# Patient Record
Sex: FEMALE | Race: BLACK OR AFRICAN AMERICAN | NOT HISPANIC OR LATINO | Employment: OTHER | URBAN - METROPOLITAN AREA
[De-identification: names, ages, dates, MRNs, and addresses within clinical notes are randomized per-mention and may not be internally consistent; named-entity substitution may affect disease eponyms.]

---

## 2018-09-10 ENCOUNTER — HOSPITAL ENCOUNTER (OUTPATIENT)
Dept: ORTHOPEDIC SURGERY | Facility: CLINIC | Age: 63
Discharge: HOME | End: 2018-09-10
Attending: PODIATRIST | Admitting: PODIATRIST

## 2019-06-11 ENCOUNTER — CONVERSION ENCOUNTER (OUTPATIENT)
Dept: ENDOCRINOLOGY | Facility: CLINIC | Age: 64
End: 2019-06-11

## 2019-06-16 VITALS
WEIGHT: 286 LBS | BODY MASS INDEX: 56.15 KG/M2 | OXYGEN SATURATION: 97 % | HEIGHT: 60 IN | SYSTOLIC BLOOD PRESSURE: 118 MMHG | DIASTOLIC BLOOD PRESSURE: 58 MMHG | HEART RATE: 93 BPM

## 2019-06-17 NOTE — PROGRESS NOTES
Visit Type:  Follow-up Visit  Referring Provider:  Naveen Mcgee MD  Primary Provider:  Naveen Mcgee MD    CC:  fu dm 2.    History of Present Illness:  Here  for diabetes f/u.  Blood sugars in the high 100's.  Had to cancel education class.  Due for eye exam.      Standards of Care   Discussed Driving Precautions: yes  Discussed Carrying Glucose Source: Advised  Discussed Wear DM Alert ID: Advised  no  Last Eye Exam: 5/2018  Influenza vaccine: 2018  Pneumovax: 2018      Past Medical History:     Reviewed history from 11/27/2018 and no changes required:        Diabetes type 2  1990's        Peripheral Neuropathy        Hypertension        Cataracts        vit D Deficiency 11/2018        Hypothyroidism  11/2018    Past Surgical History:     Reviewed history from 07/30/2018 and no changes required:        Javier  2014        R inguinal Hernia repair  2012    Family History Summary:      Reviewed history Last on 05/15/2019 and no changes required:06/16/2019      General Comments - FH:  FH Diabetes Mother, Two brothers  FH High blood pressure - [place]     Social History:     Reviewed history from 05/15/2019 and no changes required:        Patient has never smoked.        Passive Smoke: N        Alcohol Use: N        Drug Use: N        HIV/High Risk: N        Regular Exercise: N        Hx Domestic Abuse: N        Jewish Affecting Care: N                Risk Factors:     Smoked Tobacco Use:  Never smoker  Caffeine use:  2 drinks per day  Alcohol use:  no  Exercise:  no  Seatbelt use:  100 %  Sun Exposure:  occasionally    Previous Tobacco Use: Signed On - 05/15/2019  Smoked Tobacco Use:  Never smoker  Caffeine use:  2 drinks per day    Previous Alcohol Use: Signed On - 05/15/2019  Alcohol use:  no  Exercise:  no  Seatbelt use:  100 %  Sun Exposure:  occasionally    Active Medications (reviewed today):  PRAVASTATIN SODIUM 10 MG ORAL TABLET (PRAVASTATIN SODIUM) take 1 tablet daily  ADMELOG SOLOSTAR 100 UNIT/ML  SUBCUTANEOUS SOLUTION PEN-INJECTOR (INSULIN LISPRO) 15  units sq ac meals  ONETOUCH DELICA LANCETS FINE (LANCETS) use to test twice a day  LEVOTHYROXINE SODIUM 50 MCG ORAL TABLET (LEVOTHYROXINE SODIUM) one daily, 1st thing in am, by itself  VITAMIN D2 1.25MG(50,000 UNIT) (ERGOCALCIFEROL) TAKE ONE WEEKLY  METFORMIN HCL  MG ORAL TABLET EXTENDED RELEASE 24 HOUR (METFORMIN HCL) one tablet before breakfast & supper  ONETOUCH ULTRA 2 W/DEVICE KIT (BLOOD GLUCOSE MONITORING SUPPL) use to test twice a day  FOLIC ACID 1 MG ORAL TABLET (FOLIC ACID) Take 1 tablet by mouth daily  PLAVIX 75 MG ORAL TABLET (CLOPIDOGREL BISULFATE) Take 1 tablet by mouth daily  DILTIAZEM HCL ER BEADS 240 MG ORAL CAPSULE EXTENDED RELEASE 24 HOUR (DILTIAZEM HCL ER BEADS) Take 1 tablet by mouth daily  YL VITAMIN B-6 100 MG ORAL TABLET (PYRIDOXINE HCL) Take 1 tablet by mouth daily  DICYCLOMINE HCL 10 MG ORAL CAPSULE (DICYCLOMINE HCL) 1 capsule by mouth before meals  B-12 1000 MCG ORAL TABLET (CYANOCOBALAMIN) Take 1 tablet by mouth daily  VENTOLIN  (90 BASE) MCG/ACT INHALATION AEROSOL SOLUTION (ALBUTEROL SULFATE) Inhale 2 puffs 4 times a day as needed  DULOXETINE HCL 60 MG ORAL CAPSULE DELAYED RELEASE PARTICLES (DULOXETINE HCL) Take 1 tablet by mouth daily  LYRICA 150 MG ORAL CAPSULE (PREGABALIN) Take one (1) tablet by mouth twice a day  BD PEN NEEDLE MINI U/F 31G X 5 MM (INSULIN PEN NEEDLE) Use as directed 5 x a day E11.65  ONETOUCH ULTRA BLUE IN VITRO STRIP (GLUCOSE BLOOD) use to test twice a day  FUROSEMIDE 40 MG ORAL TABLET (FUROSEMIDE) Take one (1) tablet by mouth twice a day  NABUMETONE 500 MG ORAL TABLET (NABUMETONE) Take one (1) tablet by mouth three times a day  [facility] OMEPRAZOLE 20 MG ORAL TABLET DELAYED RELEASE (OMEPRAZOLE) Take 1 tablet by mouth daily  TRESIBA FLEXTOUCH 100 UNIT/ML SUBCUTANEOUS SOLUTION PEN-INJECTOR (INSULIN DEGLUDEC) 60 units twice a day    Current Allergies (reviewed today):  * PENICILLIN (Moderate)  * SULFA  (Moderate)      Review of Systems     General       lost 5 lb since last visit        Vital Signs:    Patient Profile:    63 Years Old Female  Height:     60 inches  Weight:     286 pounds  BMI:        55.85     O2 Sat:     97 %  Pulse rate: 93 / minute  BP Sittin / 58  (left arm)    Cuff size:  large      Problems: Active problems were reviewed with the patient during this visit.  Medications: Medications were reviewed with the patient during this visit.  Allergies: Allergies were reviewed with the patient during this visit.        Vitals Entered By: Lizeth Chapa MA (2019 12:09 PM)      Physical Exam    General:      obese.    Mouth:      dentures  Lungs:      clear  Heart:      regular rhythm    Abdomen:      obese, soft, normal peristalsis    Pulses:      Pulses normal in all 4 extremities.  Extremities:      trace ankle edema, plantar calluses  Neurologic:      same as 1 y ago:  absent reflexes in ankles, vibratory sense 50% decreased in toes, able to feel the 10g monofilament in heels only.    Diabetes Management Exam:      Foot Exam (with socks and/or shoes not present):        Pulses:           Pulses normal in all 4 extremities.      Blood Pressure:  Today's BP: 118/58 mm Hg          Impression & Recommendations:    Problem # 1:  Diabetes mellitus, type II, uncontrolled (ICD-250.02) (YXH86-H42.65)  Assessment: Improved  A1C 8.7% ( with hyperglycemia ), cr 1.61, BUN 29, K 3.7, ALT 24  Continue same insulin doses.  Drink plenty of water.  Increase exercise as able.  See eye doctor tomorrow as scheduled.  Her updated medication list for this problem includes:     Admelog Solostar 100 Unit/ml Subcutaneous Solution Pen-injector (Insulin lispro) ..... 15  units sq ac meals     Metformin Hcl Er 500 Mg Oral Tablet Extended Release 24 Hour (Metformin hcl) ..... One tablet before breakfast & supper     Tresiba Flextouch 100 Unit/ml Subcutaneous Solution Pen-injector (Insulin degludec) ..... 60 units  twice a day    Orders:  Glucose (06915)  [facility] HEMOGLOBIN A1c (A1DCA)  85873-Doc Vst-Est Level III (CPT-63053)      Problem # 2:  Hypertension (ICD-401.9) (FSA75-Z00)  Assessment: Unchanged  /58, P 93  Continue same meds.  Her updated medication list for this problem includes:     Diltiazem Hcl Er Beads 240 Mg Oral Capsule Extended Release 24 Hour (Diltiazem hcl er beads) ..... Take 1 tablet by mouth daily     Furosemide 40 Mg Oral Tablet (Furosemide) ..... Take one (1) tablet by mouth twice a day    Orders:  [facility] COMPREHENSIVE METABOLIC PANEL (CMP) [place]  39380-Bvy Vst-Est Level III (CPT-11247)      Medications Added to Medication List This Visit:  1)  Symbicort 160-4.5 Mcg/act Inhalation Aerosol (Budesonide-formoterol fumarate) .... Use twice daily      Patient Instructions:  1)  Keep up the good work!  2)  Reschedule diabetes education all day class.  3)  Increase exercise as able.  4)  Drink plenty of water.  5)  See eye doctor tomorrow as scheduled.  6)  Please schedule a follow-up appointment in 6 months.  7)  Be sure to return for lab work one (1) week before your next appointment as scheduled.                    Medication Administration    Orders Added:  1)  Glucose [36544]  2)  [facility] HEMOGLOBIN A1c [A1DCA]  3)  [facility] COMPREHENSIVE METABOLIC PANEL (CMP) [place]  4)  39346-Txn Vst-Est Level III [CPT-96081]  ]  Technician: Hellen Chapa MA         Date/Time Collected: June 11, 2019 12:09 PM)  Date/Time Received: June 11, 2019 12:09 PM)  Performed by: hellen chapa    Glucose (rdm):  176 mg/dL        mg/dL (normal range)  Comments:    ate@ 9am this morning  pp 3 hours ago  insulin@ 845am this morning              Electronically signed by Yo Marshall MD on 06/16/2019 at 8:36 PM  ________________________________________________________________________       Disclaimer: Converted Note message may not contain all data elements that existed in the [employer] source system. Please see [facility] for the original note details.

## 2019-06-27 RX ORDER — PRAVASTATIN SODIUM 10 MG
TABLET ORAL
Qty: 30 TABLET | Refills: 3 | OUTPATIENT
Start: 2019-06-27

## 2019-06-27 RX ORDER — INSULIN LISPRO 100 U/ML
INJECTION, SOLUTION SUBCUTANEOUS
Refills: 3 | OUTPATIENT
Start: 2019-06-27

## 2019-06-28 RX ORDER — PRAVASTATIN SODIUM 10 MG
10 TABLET ORAL EVERY EVENING
Qty: 30 TABLET | Refills: 11 | Status: SHIPPED | OUTPATIENT
Start: 2019-06-28 | End: 2020-06-27

## 2019-07-23 RX ORDER — INSULIN LISPRO 100 U/ML
INJECTION, SOLUTION SUBCUTANEOUS
Qty: 15 ML | Refills: 3 | Status: SHIPPED | OUTPATIENT
Start: 2019-07-23 | End: 2019-08-01 | Stop reason: SDUPTHER

## 2019-08-01 RX ORDER — INSULIN LISPRO 100 U/ML
INJECTION, SOLUTION SUBCUTANEOUS
Qty: 15 ML | Refills: 3 | Status: SHIPPED | OUTPATIENT
Start: 2019-08-01 | End: 2020-04-23 | Stop reason: CLARIF

## 2019-08-21 ENCOUNTER — OFFICE VISIT (OUTPATIENT)
Dept: PODIATRY | Facility: CLINIC | Age: 64
End: 2019-08-21

## 2019-08-21 VITALS
BODY MASS INDEX: 55.95 KG/M2 | DIASTOLIC BLOOD PRESSURE: 87 MMHG | SYSTOLIC BLOOD PRESSURE: 139 MMHG | WEIGHT: 285 LBS | HEART RATE: 90 BPM | HEIGHT: 60 IN

## 2019-08-21 DIAGNOSIS — M20.11 HALLUX VALGUS, RIGHT: ICD-10-CM

## 2019-08-21 DIAGNOSIS — E11.42 DM TYPE 2 WITH DIABETIC PERIPHERAL NEUROPATHY (HCC): ICD-10-CM

## 2019-08-21 DIAGNOSIS — M10.9 ACUTE GOUT OF RIGHT FOOT, UNSPECIFIED CAUSE: Primary | ICD-10-CM

## 2019-08-21 PROBLEM — I10 HYPERTENSION: Status: ACTIVE | Noted: 2018-07-30

## 2019-08-21 PROBLEM — E11.65 TYPE 2 DIABETES MELLITUS WITH HYPERGLYCEMIA (HCC): Status: ACTIVE | Noted: 2018-07-30

## 2019-08-21 PROBLEM — E55.9 VITAMIN D DEFICIENCY: Status: ACTIVE | Noted: 2018-07-30

## 2019-08-21 PROBLEM — E03.9 HYPOTHYROIDISM: Status: ACTIVE | Noted: 2018-11-27

## 2019-08-21 PROCEDURE — 99213 OFFICE O/P EST LOW 20 MIN: CPT | Performed by: PODIATRIST

## 2019-08-21 PROCEDURE — 97760 ORTHOTIC MGMT&TRAING 1ST ENC: CPT | Performed by: PODIATRIST

## 2019-08-21 RX ORDER — FUROSEMIDE 40 MG/1
40 TABLET ORAL 2 TIMES DAILY
Refills: 5 | COMMUNITY
Start: 2019-08-12

## 2019-08-21 RX ORDER — DILTIAZEM HYDROCHLORIDE 240 MG/1
240 CAPSULE, COATED, EXTENDED RELEASE ORAL EVERY MORNING
Refills: 3 | COMMUNITY
Start: 2019-05-28 | End: 2020-01-14

## 2019-08-21 RX ORDER — METFORMIN HYDROCHLORIDE 500 MG/1
TABLET, EXTENDED RELEASE ORAL
Refills: 12 | COMMUNITY
Start: 2019-07-13 | End: 2019-09-10 | Stop reason: SDUPTHER

## 2019-08-21 RX ORDER — FOLIC ACID 1 MG/1
1000 TABLET ORAL DAILY
Refills: 3 | COMMUNITY
Start: 2019-06-18

## 2019-08-21 RX ORDER — CLOPIDOGREL BISULFATE 75 MG/1
75 TABLET ORAL DAILY
Refills: 3 | COMMUNITY
Start: 2019-06-18

## 2019-08-21 RX ORDER — NICOTINE POLACRILEX 4 MG/1
GUM, CHEWING ORAL 2 TIMES DAILY
COMMUNITY
Start: 2018-07-30 | End: 2020-01-14 | Stop reason: SDUPTHER

## 2019-08-21 RX ORDER — INSULIN DEGLUDEC INJECTION 100 U/ML
INJECTION, SOLUTION SUBCUTANEOUS
Refills: 3 | COMMUNITY
Start: 2019-07-14 | End: 2020-01-10

## 2019-08-21 RX ORDER — KETOROLAC TROMETHAMINE 10 MG/1
10 TABLET, FILM COATED ORAL 3 TIMES DAILY
Refills: 0 | COMMUNITY
Start: 2019-08-11 | End: 2020-07-22

## 2019-08-21 RX ORDER — ERGOCALCIFEROL 1.25 MG/1
50000 CAPSULE ORAL
Refills: 3 | COMMUNITY
Start: 2019-08-07 | End: 2020-09-14

## 2019-08-21 RX ORDER — CYANOCOBALAMIN (VITAMIN B-12) 1000 MCG
TABLET ORAL EVERY 24 HOURS
COMMUNITY
Start: 2018-07-30 | End: 2020-01-14

## 2019-08-21 RX ORDER — DULOXETIN HYDROCHLORIDE 60 MG/1
60 CAPSULE, DELAYED RELEASE ORAL DAILY
Refills: 1 | COMMUNITY
Start: 2019-07-30

## 2019-08-21 RX ORDER — NABUMETONE 500 MG/1
500 TABLET, FILM COATED ORAL 3 TIMES DAILY
Refills: 1 | COMMUNITY
Start: 2019-05-28 | End: 2020-01-14

## 2019-08-21 RX ORDER — HYDROCODONE BITARTRATE AND ACETAMINOPHEN 5; 325 MG/1; MG/1
TABLET ORAL
Refills: 0 | COMMUNITY
Start: 2019-08-11 | End: 2020-01-14

## 2019-08-21 RX ORDER — LEVOTHYROXINE SODIUM 0.05 MG/1
TABLET ORAL
Refills: 4 | COMMUNITY
Start: 2019-05-24 | End: 2020-02-21

## 2019-08-21 NOTE — PATIENT INSTRUCTIONS
Gout    Gout is painful swelling that can occur in some of your joints. Gout is a type of arthritis. This condition is caused by having too much uric acid in your body. Uric acid is a chemical that forms when your body breaks down substances called purines. Purines are important for building body proteins.  When your body has too much uric acid, sharp crystals can form and build up inside your joints. This causes pain and swelling. Gout attacks can happen quickly and be very painful (acute gout). Over time, the attacks can affect more joints and become more frequent (chronic gout). Gout can also cause uric acid to build up under your skin and inside your kidneys.  What are the causes?  This condition is caused by too much uric acid in your blood. This can occur because:  · Your kidneys do not remove enough uric acid from your blood. This is the most common cause.  · Your body makes too much uric acid. This can occur with some cancers and cancer treatments. It can also occur if your body is breaking down too many red blood cells (hemolytic anemia).  · You eat too many foods that are high in purines. These foods include organ meats and some seafood. Alcohol, especially beer, is also high in purines.  A gout attack may be triggered by trauma or stress.  What increases the risk?  This condition is more likely to develop in people who:  · Have a family history of gout.  · Are male and middle-aged.  · Are female and have gone through menopause.  · Are obese.  · Frequently drink alcohol, especially beer.  · Are dehydrated.  · Lose weight too quickly.  · Have an organ transplant.  · Have lead poisoning.  · Take certain medicines, including aspirin, cyclosporine, diuretics, levodopa, and niacin.  · Have kidney disease or psoriasis.  What are the signs or symptoms?  An attack of acute gout happens quickly. It usually occurs in just one joint. The most common place is the big toe. Attacks often start at night. Other joints  that may be affected include joints of the feet, ankle, knee, fingers, wrist, or elbow. Symptoms may include:  · Severe pain.  · Warmth.  · Swelling.  · Stiffness.  · Tenderness. The affected joint may be very painful to touch.  · Shiny, red, or purple skin.  · Chills and fever.  Chronic gout may cause symptoms more frequently. More joints may be involved. You may also have white or yellow lumps (tophi) on your hands or feet or in other areas near your joints.  How is this diagnosed?  This condition is diagnosed based on your symptoms, medical history, and physical exam. You may have tests, such as:  · Blood tests to measure uric acid levels.  · Removal of joint fluid with a needle (aspiration) to look for uric acid crystals.  · X-rays to look for joint damage.  How is this treated?  Treatment for this condition has two phases: treating an acute attack and preventing future attacks. Acute gout treatment may include medicines to reduce pain and swelling, including:  · NSAIDs.  · Steroids. These are strong anti-inflammatory medicines that can be taken by mouth (orally) or injected into a joint.  · Colchicine. This medicine relieves pain and swelling when it is taken soon after an attack. It can be given orally or through an IV tube.  Preventive treatment may include:  · Daily use of smaller doses of NSAIDs or colchicine.  · Use of a medicine that reduces uric acid levels in your blood.  · Changes to your diet. You may need to see a specialist about healthy eating (dietitian).  Follow these instructions at home:  During a gout attack    · If directed, apply ice to the affected area:  ? Put ice in a plastic bag.  ? Place a towel between your skin and the bag.  ? Leave the ice on for 20 minutes, 2-3 times a day.  · Rest the joint as much as possible. If the affected joint is in your leg, you may be given crutches to use.  · Raise (elevate) the affected joint above the level of your heart as often as possible.  · Drink  enough fluids to keep your urine pale yellow.  · Take over-the-counter and prescription medicines only as told by your health care provider.  · Do not drive or operate heavy machinery while taking prescription pain medicine.  · Follow instructions from your health care provider about eating or drinking restrictions.  · Return to your normal activities as told by your health care provider. Ask your health care provider what activities are safe for you.  Avoiding future gout attacks    · Follow a low-purine diet as told by your dietitian or health care provider. Avoid foods and drinks that are high in purines, including liver, kidney, anchovies, asparagus, herring, mushrooms, mussels, and beer.  · Limit alcohol intake to no more than 1 drink a day for nonpregnant women and 2 drinks a day for men. One drink equals 12 oz of beer, 5 oz of wine, or 1½ oz of hard liquor.  · Maintain a healthy weight or lose weight if you are overweight. If you want to lose weight, talk with your health care provider. It is important that you do not lose weight too quickly.  · Start or maintain an exercise program as told by your health care provider.  · Drink enough fluids to keep your urine pale yellow.  · Take over-the-counter and prescription medicines only as told by your health care provider.  · Keep all follow-up visits as told by your health care provider. This is important.  Contact a health care provider if:  · You have another gout attack.  · You continue to have symptoms of a gout attack after10 days of treatment.  · You have side effects from your medicines.  · You have chills or a fever.  · You have burning pain when you urinate.  · You have pain in your lower back or belly.  Get help right away if:  · You have severe or uncontrolled pain.  · You cannot urinate.  This information is not intended to replace advice given to you by your health care provider. Make sure you discuss any questions you have with your health care  provider.  Document Released: 12/15/2001 Document Revised: 09/11/2018 Document Reviewed: 09/29/2016  [employer] Interactive Patient Education © 2019 [employer]

## 2019-08-21 NOTE — PROGRESS NOTES
08/21/2019  Foot and Ankle Surgery - Established Patient/Follow-up  Provider: Dr. Bruce Giles DPM  Location: [facility]    Subjective:  Radha Davis is a 63 y.o. female.     Chief Complaint   Patient presents with   • Right Foot - Pain       HPI: Patient returns for routine diabetic foot check and recent issue of right foot pain.  She was evaluated by an outside ED who diagnosed her with gout.  She was given an anti-inflammatory and has noticed significant improvement.  Her uric acid at that time was approximately 11mg/dL.  She does have an upcoming appointment with her primary care physician as well.  She states that her blood sugars have improved substantially since last exam with an A1c of approximately 7-8%.  She denies any other issues with her foot.  Her only complaint today is that she is having mild discomfort to the foot with difficulty walking.    Allergies   Allergen Reactions   • Penicillin G Hives   • Sulfa Antibiotics Hives       Current Outpatient Medications on File Prior to Visit   Medication Sig Dispense Refill   • ADMELOG SOLOSTAR 100 UNIT/ML solution pen-injector Inject 15 units with meals 15 mL 3   • clopidogrel (PLAVIX) 75 MG tablet Take 75 mg by mouth Daily.  3   • Cyanocobalamin (B-12) 1000 MCG tablet Daily.     • diltiaZEM CD (CARDIZEM CD) 240 MG 24 hr capsule Take 240 mg by mouth Every Morning.  3   • DULoxetine (CYMBALTA) 60 MG capsule Take 60 mg by mouth Daily.  1   • folic acid (FOLVITE) 1 MG tablet Take 1,000 mcg by mouth Daily.  3   • furosemide (LASIX) 40 MG tablet Take 40 mg by mouth 2 (Two) Times a Day.  5   • glucose blood (ONE TOUCH ULTRA TEST) test strip Use to check blood sugar 4 time daily dx:e11.65 400 each 1   • HYDROcodone-acetaminophen (NORCO) 5-325 MG per tablet TAKE 1 TABLET BY MOUTH EVERY 4 HOURS AS NEEDED PAIN  0   • Insulin Pen Needle (B-D UF III MINI PEN NEEDLES) 31G X 5 MM misc With to inject insulin Dx:e11.65 100 each 2   • ketorolac (TORADOL) 10 MG  "tablet Take 10 mg by mouth 3 (Three) Times a Day.  0   • levothyroxine (SYNTHROID, LEVOTHROID) 50 MCG tablet TAKE 1 TABLET FIRST THING IN THE MORNING, BY ITSELF  4   • LYRICA 150 MG capsule TAKE 1 CAPSULE BY MOUTH TWICE A DAY-5/20  2   • metFORMIN ER (GLUCOPHAGE-XR) 500 MG 24 hr tablet TAKE 1 TABLET BY MOUTH BEFORE BREAKFAST & SUPPER  12   • nabumetone (RELAFEN) 500 MG tablet Take 500 mg by mouth 3 (Three) Times a Day.  1   • Omeprazole ([facility] OMEPRAZOLE) 20 MG tablet delayed-release Daily.     • pravastatin (PRAVACHOL) 10 MG tablet Take 1 tablet by mouth Every Evening. 30 tablet 11   • TRESIBA FLEXTOUCH 100 UNIT/ML solution pen-injector injection INJECT 60 UNITS SUB Q TWICE A DAY  3   • vitamin D (ERGOCALCIFEROL) 48541 units capsule capsule TAKE ONE CAPSULE ONCE WEEKLY  3     No current facility-administered medications on file prior to visit.        Objective   /87   Pulse 90   Ht 152.4 cm (60\")   Wt 129 kg (285 lb)   BMI 55.66 kg/m²     Podiatry Exam       General Appearance:  obese.    Mental Status Exam        Judgement and Insight:  Intact       Orientation:  Oriented to time, place, and person  Cardiovascular (Bilateral)       Dorsalis Pedis Pulse (BL):  2/4       Posterior Tibialis Pulse (BL):  2/4       Capillary Filling Time (BL):  1-3 Seconds       Edema (BL):  No Edema  Dermatological Exam       Temperature:  warm to warm  Skin:   No significant erythema and calor  Neurological Exam (Bilateral)       Paresthesia (Bl):  negative       Patella DTRs (Bl):  symmetric       Achilles DTRs (Bl):  symmetric       Tinel over Tarsal Tunnel (Bl):  negative  Monofilament Test (Bl):   not intact       Diabetic Risk (Bl):  Loss of protective sensation with no weakness deformity callus or pre-ulcer  Hypertrophic Nail Description (Left)       Thickened:  1, 2, 3, 4, 5       >3mm:  1, 2, 3, 4, 5       Onychomycosis:  1  Hypertrophic Nail Description (Right)       Thickened:  1, 2, 3, 4, 5       >3mm:  1, 2, 3, 4, " 5     MusculoSkeletal Exam (Bilateral)       Gait and Stance (Bl):   abducted in a pronated, bilateral.       ROM (Bl):    Ankle and pedal joint range of motion is somewhat limited but nontender crepitus free       Muscle Strength (Bl):  symmetrical 5/5       Subluxed Digits (Bl):   lateral deviation of the hallux with moderate bunion deformity, right       Inflammed Joints (Bl):  no inflammation of joints       Hammertoe Deformity (Bl):  none       Claw Toe Deformity (Bl): none       Medial Turbicle Calc (Bl):  no pain       Gastroc soleus equinus (Bl):  Inability to dorsiflex past neutral position both straight legged and bent knee       Post tibial tendon (Bl):  no soreness noted       Peroneal Tendon (Bl):  no soreness noted       Capsulitis of the MPJs (Bl):  no soreness noted  Additional Musculoskelatal Findings  Mild discomfort with palpation to the first ray and forefoot.  Decreased medial arches, bilateral.  No progressive deformity or instability       Assessment/Plan   Radha was seen today for pain.    Diagnoses and all orders for this visit:    Acute gout of right foot, unspecified cause  -     XR Foot 3+ View Right    DM type 2 with diabetic peripheral neuropathy (CMS/HCC)    Hallux valgus, right      Patient presents with recent onset of pain, redness, and discomfort affecting the right forefoot.  She was diagnosed with acute onset gout.  She has not had these issues in the past.  She has noticed improvement with the anti-inflammatory but continues to have mild discomfort and difficulty walking.  I did review the diagnosis and further treatment options.  She does have an upcoming appoint with her primary care physician to discuss long-term uric acid suppression.  I have dispensed a Cam boot today and spent greater than 15 minutes discussing the proper use and effects.  I do anticipate symptoms will gradually improve.  She is to call with any increase in symptoms.    Explained importance of diabetic  foot care, daily foot checks, and glycemic control. Patient should check both feet on a daily basis, monitor and control blood sugars, make sure that both feet and in between toes are towel dried after baths or showers. Avoid barefoot walking at all times. Check shoes before putting them on.   Patient was given information on proper foot care. Call the office at the first signs of a wound or with signs of infection.    Orders Placed This Encounter   Procedures   • XR Foot 3+ View Right     Order Specific Question:   Reason for Exam:     Answer:   Right foot pain her pain is all in the great toe joint and on the top of the foot, RM 14 WB          Note is dictated utilizing voice recognition software. Unfortunately this leads to occasional typographical errors. I apologize in advance if the situation occurs. If questions occur please do not hesitate to call our office.

## 2019-09-10 RX ORDER — METFORMIN HYDROCHLORIDE 500 MG/1
TABLET, EXTENDED RELEASE ORAL
Qty: 90 TABLET | Refills: 2 | Status: SHIPPED | OUTPATIENT
Start: 2019-09-10 | End: 2020-05-05

## 2019-10-02 ENCOUNTER — OFFICE VISIT (OUTPATIENT)
Dept: PODIATRY | Facility: CLINIC | Age: 64
End: 2019-10-02

## 2019-10-02 VITALS
HEIGHT: 60 IN | BODY MASS INDEX: 51.24 KG/M2 | WEIGHT: 261 LBS | HEART RATE: 103 BPM | SYSTOLIC BLOOD PRESSURE: 176 MMHG | DIASTOLIC BLOOD PRESSURE: 87 MMHG

## 2019-10-02 DIAGNOSIS — M10.9 ACUTE GOUT OF RIGHT FOOT, UNSPECIFIED CAUSE: Primary | ICD-10-CM

## 2019-10-02 PROCEDURE — 99213 OFFICE O/P EST LOW 20 MIN: CPT | Performed by: PODIATRIST

## 2019-10-02 RX ORDER — ALLOPURINOL 300 MG/1
300 TABLET ORAL DAILY
Refills: 5 | COMMUNITY
Start: 2019-08-29 | End: 2020-01-14

## 2019-10-02 RX ORDER — MOMETASONE FUROATE AND FORMOTEROL FUMARATE DIHYDRATE 200; 5 UG/1; UG/1
2 AEROSOL RESPIRATORY (INHALATION) AS NEEDED
Refills: 5 | COMMUNITY
Start: 2019-08-29

## 2019-10-02 RX ORDER — COLCHICINE 0.6 MG/1
1 CAPSULE ORAL DAILY
Qty: 30 CAPSULE | Refills: 1 | Status: SHIPPED | OUTPATIENT
Start: 2019-10-02 | End: 2020-01-14

## 2019-10-02 NOTE — PROGRESS NOTES
10/02/2019  Foot and Ankle Surgery - Established Patient/Follow-up  Provider: Dr. Bruce Giles DPM  Location: [facility]    Subjective:  Radha Davis is a 63 y.o. female.     Chief Complaint   Patient presents with   • Right Foot - Pain       HPI: She returns with increased discomfort involving the right foot.  She states that symptoms have been present for the last few days.  She complains of a majority of the pain involving the right first metatarsal phalangeal joint.  She has also been having acute issues involving her right shoulder.  She was started on allopurinol by her primary care physician however she did stop taking due to GI upset.  She denies any other issues.  Continues to have 8 out of 10 pain to the first MTPJ.    Allergies   Allergen Reactions   • Penicillin G Hives   • Sulfa Antibiotics Hives       Current Outpatient Medications on File Prior to Visit   Medication Sig Dispense Refill   • ADMELOG SOLOSTAR 100 UNIT/ML solution pen-injector Inject 15 units with meals 15 mL 3   • allopurinol (ZYLOPRIM) 300 MG tablet Take 300 mg by mouth Daily.  5   • clopidogrel (PLAVIX) 75 MG tablet Take 75 mg by mouth Daily.  3   • Cyanocobalamin (B-12) 1000 MCG tablet Daily.     • diltiaZEM CD (CARDIZEM CD) 240 MG 24 hr capsule Take 240 mg by mouth Every Morning.  3   • DULERA 200-5 MCG/ACT inhaler Inhale 2 puffs 2 (Two) Times a Day.  5   • DULoxetine (CYMBALTA) 60 MG capsule Take 60 mg by mouth Daily.  1   • folic acid (FOLVITE) 1 MG tablet Take 1,000 mcg by mouth Daily.  3   • furosemide (LASIX) 40 MG tablet Take 40 mg by mouth 2 (Two) Times a Day.  5   • glucose blood (ONE TOUCH ULTRA TEST) test strip Use to check blood sugar 4 time daily dx:e11.65 400 each 1   • HYDROcodone-acetaminophen (NORCO) 5-325 MG per tablet TAKE 1 TABLET BY MOUTH EVERY 4 HOURS AS NEEDED PAIN  0   • Insulin Pen Needle (B-D UF III MINI PEN NEEDLES) 31G X 5 MM misc With to inject insulin Dx:e11.65 100 each 2   • ketorolac  "(TORADOL) 10 MG tablet Take 10 mg by mouth 3 (Three) Times a Day.  0   • levothyroxine (SYNTHROID, LEVOTHROID) 50 MCG tablet TAKE 1 TABLET FIRST THING IN THE MORNING, BY ITSELF  4   • LYRICA 150 MG capsule TAKE 1 CAPSULE BY MOUTH TWICE A DAY-5/20  2   • metFORMIN ER (GLUCOPHAGE-XR) 500 MG 24 hr tablet TAKE 1 TABLET BY MOUTH BEFORE BREAKFAST & SUPPER 90 tablet 2   • nabumetone (RELAFEN) 500 MG tablet Take 500 mg by mouth 3 (Three) Times a Day.  1   • Omeprazole ([facility] OMEPRAZOLE) 20 MG tablet delayed-release Daily.     • pravastatin (PRAVACHOL) 10 MG tablet Take 1 tablet by mouth Every Evening. 30 tablet 11   • TRESIBA FLEXTOUCH 100 UNIT/ML solution pen-injector injection INJECT 60 UNITS SUB Q TWICE A DAY  3   • vitamin D (ERGOCALCIFEROL) 30283 units capsule capsule TAKE ONE CAPSULE ONCE WEEKLY  3     No current facility-administered medications on file prior to visit.        Objective   /87   Pulse 103   Ht 152.4 cm (60\")   Wt 118 kg (261 lb)   BMI 50.97 kg/m²     Podiatry Exam       General Appearance:  obese.    Mental Status Exam        Judgement and Insight:  Intact       Orientation:  Oriented to time, place, and person  Cardiovascular (Bilateral)       Dorsalis Pedis Pulse (BL):  2/4       Posterior Tibialis Pulse (BL):  2/4       Capillary Filling Time (BL):  1-3 Seconds       Edema (BL):  No Edema  Dermatological Exam       Temperature:  warm to warm  Skin:   No significant erythema and calor  Neurological Exam (Bilateral)       Paresthesia (Bl):  negative       Patella DTRs (Bl):  symmetric       Achilles DTRs (Bl):  symmetric       Tinel over Tarsal Tunnel (Bl):  negative  Monofilament Test (Bl):   not intact       Diabetic Risk (Bl):  Loss of protective sensation with no weakness deformity callus or pre-ulcer  Hypertrophic Nail Description (Left)       Thickened:  1, 2, 3, 4, 5       >3mm:  1, 2, 3, 4, 5       Onychomycosis:  1  Hypertrophic Nail Description (Right)       Thickened:  1, 2, 3, 4, " 5       >3mm:  1, 2, 3, 4, 5     MusculoSkeletal Exam (Bilateral)       Gait and Stance (Bl):   abducted in a pronated, bilateral.       ROM (Bl):    Ankle and pedal joint range of motion is somewhat limited but nontender crepitus free       Muscle Strength (Bl):  symmetrical 5/5       Subluxed Digits (Bl):   lateral deviation of the hallux with moderate bunion deformity, right       Inflammed Joints (Bl):  no inflammation of joints       Hammertoe Deformity (Bl):  none       Claw Toe Deformity (Bl): none       Medial Turbicle Calc (Bl):  no pain       Gastroc soleus equinus (Bl):  Inability to dorsiflex past neutral position both straight legged and bent knee       Post tibial tendon (Bl):  no soreness noted       Peroneal Tendon (Bl):  no soreness noted       Capsulitis of the MPJs (Bl):  no soreness noted  Additional Musculoskelatal Findings  Moderate discomfort with palpation to the first ray.  No progressive deformity.    Assessment/Plan   Radha was seen today for pain.    Diagnoses and all orders for this visit:    Acute gout of right foot, unspecified cause  -     colchicine 0.6 MG capsule capsule; Take 1 capsule by mouth Daily.      Patient returns for acute gout flare involving the right foot.  She also complains of issues involving the right shoulder.  She was unable to take allopurinol long-term secondary to GI distress.  She has not seen her primary care physician since that time.  At this time, I have asked that she continue wearing the cam boot for protection and offloading.  I have ordered colchicine for symptom management.  We did discuss anti-inflammatory measures including warm compress.  I would like her to follow-up with the rheumatologist for concerns of ongoing polyarticular inflammatory arthritis/gout.  I would like to see her in 3 months for routine diabetic foot check.  She is to call with any additional issues or concerns.    No orders of the defined types were placed in this  encounter.         Note is dictated utilizing voice recognition software. Unfortunately this leads to occasional typographical errors. I apologize in advance if the situation occurs. If questions occur please do not hesitate to call our office.

## 2019-10-22 ENCOUNTER — TELEPHONE (OUTPATIENT)
Dept: ENDOCRINOLOGY | Facility: CLINIC | Age: 64
End: 2019-10-22

## 2019-10-22 NOTE — TELEPHONE ENCOUNTER
Patient called and states that she is switching to Medicare Nov 1st and when she does, they do not cover her admelog, they are wanting her to switch to lispro. When she is needing a refill she will call and reference this telephone encounter for what medicare covers for her insulin after Nov 1st.

## 2019-11-06 ENCOUNTER — TELEPHONE (OUTPATIENT)
Dept: ENDOCRINOLOGY | Facility: CLINIC | Age: 64
End: 2019-11-06

## 2019-11-11 RX ORDER — INSULIN LISPRO 100 U/ML
INJECTION, SOLUTION SUBCUTANEOUS
Qty: 15 ML | Refills: 1 | Status: SHIPPED | OUTPATIENT
Start: 2019-11-11 | End: 2020-01-14

## 2019-12-11 RX ORDER — BLOOD SUGAR DIAGNOSTIC
STRIP MISCELLANEOUS
Qty: 100 EACH | Refills: 3 | Status: SHIPPED | OUTPATIENT
Start: 2019-12-11 | End: 2020-06-04

## 2019-12-16 RX ORDER — LANCETS
EACH MISCELLANEOUS
Qty: 100 EACH | Refills: 3 | Status: SHIPPED | OUTPATIENT
Start: 2019-12-16 | End: 2020-07-27

## 2020-01-07 PROBLEM — M19.079 ARTHRITIS OF FOOT: Status: ACTIVE | Noted: 2018-09-10

## 2020-01-07 PROBLEM — E11.49 TYPE 2 DIABETES MELLITUS WITH OTHER DIABETIC NEUROLOGICAL COMPLICATION (HCC): Status: ACTIVE | Noted: 2018-07-30

## 2020-01-07 PROBLEM — M79.673 PAIN OF FOOT: Status: ACTIVE | Noted: 2018-09-10

## 2020-01-07 PROBLEM — L60.3 ONYCHODYSTROPHY: Status: ACTIVE | Noted: 2018-09-10

## 2020-01-07 RX ORDER — SPIRONOLACTONE 25 MG/1
25 TABLET ORAL 2 TIMES DAILY
COMMUNITY
Start: 2019-12-31

## 2020-01-07 RX ORDER — OMEPRAZOLE 20 MG/1
20 CAPSULE, DELAYED RELEASE ORAL 2 TIMES DAILY
COMMUNITY
Start: 2020-01-02

## 2020-01-07 RX ORDER — DICYCLOMINE HCL 20 MG
TABLET ORAL
COMMUNITY
Start: 2020-01-02 | End: 2020-01-14 | Stop reason: SDUPTHER

## 2020-01-10 RX ORDER — INSULIN DEGLUDEC INJECTION 100 U/ML
INJECTION, SOLUTION SUBCUTANEOUS
Qty: 45 ML | Refills: 1 | Status: SHIPPED | OUTPATIENT
Start: 2020-01-10 | End: 2020-01-14

## 2020-01-14 ENCOUNTER — OFFICE VISIT (OUTPATIENT)
Dept: ENDOCRINOLOGY | Facility: CLINIC | Age: 65
End: 2020-01-14

## 2020-01-14 VITALS
SYSTOLIC BLOOD PRESSURE: 136 MMHG | HEIGHT: 60 IN | DIASTOLIC BLOOD PRESSURE: 68 MMHG | BODY MASS INDEX: 49.48 KG/M2 | HEART RATE: 94 BPM | OXYGEN SATURATION: 97 % | WEIGHT: 252 LBS

## 2020-01-14 DIAGNOSIS — E11.49 TYPE 2 DIABETES MELLITUS WITH OTHER DIABETIC NEUROLOGICAL COMPLICATION (HCC): Primary | ICD-10-CM

## 2020-01-14 DIAGNOSIS — E55.9 VITAMIN D DEFICIENCY: ICD-10-CM

## 2020-01-14 DIAGNOSIS — E03.9 HYPOTHYROIDISM, UNSPECIFIED TYPE: ICD-10-CM

## 2020-01-14 DIAGNOSIS — E66.01 MORBIDLY OBESE (HCC): ICD-10-CM

## 2020-01-14 LAB — GLUCOSE BLDC GLUCOMTR-MCNC: 193 MG/DL (ref 70–130)

## 2020-01-14 PROCEDURE — 82962 GLUCOSE BLOOD TEST: CPT | Performed by: INTERNAL MEDICINE

## 2020-01-14 PROCEDURE — 99214 OFFICE O/P EST MOD 30 MIN: CPT | Performed by: INTERNAL MEDICINE

## 2020-01-14 RX ORDER — DICYCLOMINE HCL 20 MG
20 TABLET ORAL EVERY 6 HOURS
COMMUNITY
End: 2020-07-22

## 2020-01-14 RX ORDER — INSULIN DEGLUDEC INJECTION 100 U/ML
INJECTION, SOLUTION SUBCUTANEOUS
Qty: 45 ML | Refills: 1
Start: 2020-01-14 | End: 2020-01-21 | Stop reason: CLARIF

## 2020-01-14 NOTE — PATIENT INSTRUCTIONS
Continue exercise.  Reschedule Dr. Giles appt.  Drink plenty of water.  Increase Tresiba to 66 units @ bedtime.  Continue other meds.  Check blood sugar before meals & bedtime at least 1 d / week.  Call if blood sugars are running under 100 or over 200.  F/u in 6 months, with fasting labs prior.

## 2020-01-16 ENCOUNTER — TELEPHONE (OUTPATIENT)
Dept: ENDOCRINOLOGY | Facility: CLINIC | Age: 65
End: 2020-01-16

## 2020-01-16 NOTE — TELEPHONE ENCOUNTER
Received letter from insurance stating Tresiba is no longer covered. Patient will call insurance to find out what is on the formulary. She will call back.

## 2020-01-16 NOTE — TELEPHONE ENCOUNTER
Patient called back, she called her insurance, they said they do cover it. I called phrmacy to have them rerun it, they pulled her up and said there is no issue, patient picked up on January 10th.

## 2020-01-19 PROBLEM — E66.01 MORBIDLY OBESE (HCC): Status: ACTIVE | Noted: 2020-01-19

## 2020-01-20 NOTE — PROGRESS NOTES
[facility]        Patient Care Team:  Naveen Mcgee as PCP - General    Chief Complaint:    Chief Complaint   Patient presents with   • Diabetes     type 2         Subjective   Here for diabetes f/u  Blood sugars: in the low 200's in am  Exercise program: limited  Missed podiatrist appt    Interval History:     Patient Complaints: none  Patient Denies:  hypoglycemia  History taken from: patient    Review of Systems:   Review of Systems   Eyes: Negative for blurred vision.   Gastrointestinal: Negative for nausea.   Endocrine: Negative for polyuria.   Neurological: Negative for headache.     Lost  34 lb since last visit    Objective     Vital Signs      Vitals:    01/14/20 1443   BP: 136/68   Pulse: 94   SpO2: 97%         Physical Exam:     General Appearance:    Alert, cooperative, in no acute distress. Obese   Head:    Normocephalic, without obvious abnormality, atraumatic   Eyes:            Lids and lashes normal, conjunctivae and sclerae normal, no   icterus, no pallor, corneas clear, PERRLA   Throat:   No oral lesions,  oral mucosa moist. Dentures   Neck:   No adenopathy, supple,  no thyromegaly, no   carotid bruit   Lungs:     Decreased breath sounds    Heart:    Regular rhythm and normal rate   Chest Wall:    No abnormalities observed   Abdomen:     Normal bowel sounds, soft, obese                 Extremities:   Plantar calluses, toe nails need trimming, R bunion, no edema               Pulses:   Pulses palpable and equal bilaterally   Skin:   Dry   Neurologic:  DTR absent in toes, able to feel the 10g monofilament in heels only          Results Review:    I have reviewed the patient's new clinical results, labs & imaging.    Medication Review:   Prior to Admission medications    Medication Sig Start Date End Date Taking? Authorizing Provider   ACCU-CHEK FASTCLIX LANCETS misc USE TO TEST TWICE A DAY 12/16/19  Yes Yo Marshall MD   ACCU-CHEK SMARTVIEW test strip USE TO TEST TWICE A  DAY dx:e11.65 12/11/19  Yes Yo Marshall MD   ADMELOG SOLOSTAR 100 UNIT/ML solution pen-injector Inject 15 units with meals 8/1/19  Yes Yo Marshall MD   clopidogrel (PLAVIX) 75 MG tablet Take 75 mg by mouth Daily. 6/18/19  Yes Vy Armando MD   dicyclomine (BENTYL) 20 MG tablet Take 20 mg by mouth Every 6 (Six) Hours.   Yes ProviderVy MD   DULERA 200-5 MCG/ACT inhaler Inhale 2 puffs 2 (Two) Times a Day. 8/29/19  Yes Vy Armando MD   DULoxetine (CYMBALTA) 60 MG capsule Take 60 mg by mouth Daily. 7/30/19  Yes Vy Armando MD   folic acid (FOLVITE) 1 MG tablet Take 1,000 mcg by mouth Daily. 6/18/19  Yes Vy Armando MD   furosemide (LASIX) 40 MG tablet Take 40 mg by mouth 2 (Two) Times a Day. 8/12/19  Yes Vy Armando MD   Insulin Pen Needle (B-D UF III MINI PEN NEEDLES) 31G X 5 MM misc With to inject insulin Dx:e11.65 6/26/19  Yes Yo Marshall MD   ketorolac (TORADOL) 10 MG tablet Take 10 mg by mouth 3 (Three) Times a Day. 8/11/19  Yes Vy Armando MD   levothyroxine (SYNTHROID, LEVOTHROID) 50 MCG tablet TAKE 1 TABLET FIRST THING IN THE MORNING, BY ITSELF 5/24/19  Yes ProviderVy MD   LYRICA 150 MG capsule TAKE 1 CAPSULE BY MOUTH TWICE A DAY-5/20 7/1/19  Yes ProviderVy MD   omeprazole (priLOSEC) 20 MG capsule  1/2/20  Yes ProviderVy MD   pravastatin (PRAVACHOL) 10 MG tablet Take 1 tablet by mouth Every Evening. 6/28/19 6/27/20 Yes Myesha Riggs APRN   spironolactone (ALDACTONE) 25 MG tablet Take 25 mg by mouth 2 (Two) Times a Day. 12/31/19  Yes Vy Armando MD   TRESIBA FLEXTOUCH 100 UNIT/ML solution pen-injector injection Inject 60 units in am, 66 units QHS 1/14/20  Yes Yo Marshall MD   vitamin D (ERGOCALCIFEROL) 10234 units capsule capsule TAKE ONE CAPSULE ONCE WEEKLY 8/7/19  Yes Provider, MD Vy   metFORMIN ER (GLUCOPHAGE-XR) 500 MG 24 hr tablet TAKE 1 TABLET BY MOUTH BEFORE  BREAKFAST & SUPPER 9/10/19   Yo Marshall MD       Lab Results (most recent)     Procedure Component Value Units Date/Time    POC Glucose Fingerstick [[id number]]  (Abnormal) Collected:  01/14/20 1432    Specimen:  Blood Updated:  01/14/20 1434     Glucose 193 mg/dL      Comment: ate@ 1p tpday, 1.5 hours ago, insulin@ 8a this morning           A1C 9.6%, cr 0.84, K 4.0, ALT 16 on 1/10/2020      Assessment/Plan     Radha was seen today for diabetes.    Diagnoses and all orders for this visit:    Type 2 diabetes mellitus with other diabetic neurological complication (CMS/HCC)  -     POC Glucose Fingerstick  -     Hemoglobin A1c; Future  -     Microalbumin / Creatinine Urine Ratio - Urine, Clean Catch; Future  -     Comprehensive Metabolic Panel; Future    Vitamin D deficiency  -     Vitamin D 25 Hydroxy; Future    Hypothyroidism, unspecified type  -     Lipid Panel; Future  -     TSH; Future  -     T4, Free; Future    Morbidly obese (CMS/[facility])    Other orders  -     TRESIBA FLEXTOUCH 100 UNIT/ML solution pen-injector injection; Inject 60 units in am, 66 units QHS        Continue exercise.  Reschedule Dr. Giles appt.  Drink plenty of water.  Increase Tresiba to 66 units @ bedtime.  Continue other meds.  Check blood sugar before meals & bedtime at least 1 d / week.  Call if blood sugars are running under 100 or over 200.          Yo Marshall MD  01/19/20  9:08 PM

## 2020-01-21 RX ORDER — INSULIN GLARGINE 100 [IU]/ML
INJECTION, SOLUTION SUBCUTANEOUS
Qty: 45 ML | Refills: 1 | Status: SHIPPED | OUTPATIENT
Start: 2020-01-21 | End: 2020-02-17

## 2020-02-03 ENCOUNTER — OFFICE VISIT (OUTPATIENT)
Dept: PODIATRY | Facility: CLINIC | Age: 65
End: 2020-02-03

## 2020-02-03 VITALS
WEIGHT: 255 LBS | HEART RATE: 116 BPM | HEIGHT: 60 IN | SYSTOLIC BLOOD PRESSURE: 157 MMHG | DIASTOLIC BLOOD PRESSURE: 64 MMHG | BODY MASS INDEX: 50.06 KG/M2

## 2020-02-03 DIAGNOSIS — E11.42 DM TYPE 2 WITH DIABETIC PERIPHERAL NEUROPATHY (HCC): ICD-10-CM

## 2020-02-03 DIAGNOSIS — M20.11 HALLUX VALGUS, RIGHT: Primary | ICD-10-CM

## 2020-02-03 PROCEDURE — 99213 OFFICE O/P EST LOW 20 MIN: CPT | Performed by: PODIATRIST

## 2020-02-03 NOTE — PROGRESS NOTES
02/03/2020  Foot and Ankle Surgery - Established Patient/Follow-up  Provider: Dr. Bruce Giles DPM  Location: [facility]    Subjective:  Radah Davis is a 64 y.o. female.     Chief Complaint   Patient presents with   • Left Foot - Follow-up   • Right Foot - Follow-up       HPI: Patient returns for routine follow-up and continues to complain of right great toe pain.  She states that she is unable to wear certain shoes because of the amount of discomfort that she has to the first metatarsal phalangeal joint region.  She has not noticed any significant swelling or redness.  She denies any open wounds or infections.  She states that her most recent A1c was 9.6%.  She would like to discuss treatment options regarding the bunion deformity.    Allergies   Allergen Reactions   • Penicillin G Hives   • Sulfa Antibiotics Hives       Current Outpatient Medications on File Prior to Visit   Medication Sig Dispense Refill   • ACCU-CHEK FASTCLIX LANCETS misc USE TO TEST TWICE A  each 3   • ACCU-CHEK SMARTVIEW test strip USE TO TEST TWICE A DAY dx:e11.65 100 each 3   • ADMELOG SOLOSTAR 100 UNIT/ML solution pen-injector Inject 15 units with meals 15 mL 3   • clopidogrel (PLAVIX) 75 MG tablet Take 75 mg by mouth Daily.  3   • dicyclomine (BENTYL) 20 MG tablet Take 20 mg by mouth Every 6 (Six) Hours.     • DULERA 200-5 MCG/ACT inhaler Inhale 2 puffs 2 (Two) Times a Day.  5   • DULoxetine (CYMBALTA) 60 MG capsule Take 60 mg by mouth Daily.  1   • folic acid (FOLVITE) 1 MG tablet Take 1,000 mcg by mouth Daily.  3   • furosemide (LASIX) 40 MG tablet Take 40 mg by mouth 2 (Two) Times a Day.  5   • Insulin Pen Needle (B-D UF III MINI PEN NEEDLES) 31G X 5 MM misc With to inject insulin Dx:e11.65 100 each 2   • ketorolac (TORADOL) 10 MG tablet Take 10 mg by mouth 3 (Three) Times a Day.  0   • LANTUS SOLOSTAR 100 UNIT/ML injection pen Inject 60 units in am, 66 units QHS dx:e11.65 45 mL 1   • levothyroxine (SYNTHROID,  "LEVOTHROID) 50 MCG tablet TAKE 1 TABLET FIRST THING IN THE MORNING, BY ITSELF  4   • LYRICA 150 MG capsule TAKE 1 CAPSULE BY MOUTH TWICE A DAY-5/20  2   • metFORMIN ER (GLUCOPHAGE-XR) 500 MG 24 hr tablet TAKE 1 TABLET BY MOUTH BEFORE BREAKFAST & SUPPER 90 tablet 2   • omeprazole (priLOSEC) 20 MG capsule      • pravastatin (PRAVACHOL) 10 MG tablet Take 1 tablet by mouth Every Evening. 30 tablet 11   • spironolactone (ALDACTONE) 25 MG tablet Take 25 mg by mouth 2 (Two) Times a Day.     • vitamin D (ERGOCALCIFEROL) 71782 units capsule capsule TAKE ONE CAPSULE ONCE WEEKLY  3     No current facility-administered medications on file prior to visit.        Objective   /64 (BP Location: Left arm, Patient Position: Sitting, Cuff Size: Large Adult)   Pulse 116   Ht 152.4 cm (60\")   Wt 116 kg (255 lb)   BMI 49.80 kg/m²     Podiatry Exam       General Appearance:  obese.    Mental Status Exam        Judgement and Insight:  Intact       Orientation:  Oriented to time, place, and person  Cardiovascular (Bilateral)       Dorsalis Pedis Pulse (BL):  2/4       Posterior Tibialis Pulse (BL):  2/4       Capillary Filling Time (BL):  1-3 Seconds       Edema (BL):  No Edema  Dermatological Exam       Temperature:  warm to warm  Skin:   No significant erythema and calor  Neurological Exam (Bilateral)       Paresthesia (Bl):  negative       Patella DTRs (Bl):  symmetric       Achilles DTRs (Bl):  symmetric       Tinel over Tarsal Tunnel (Bl):  negative  Monofilament Test (Bl):   not intact       Diabetic Risk (Bl):  Loss of protective sensation with no weakness deformity callus or pre-ulcer  Hypertrophic Nail Description (Left)       Thickened:  1, 2, 3, 4, 5       >3mm:  1, 2, 3, 4, 5       Onychomycosis:  1  Hypertrophic Nail Description (Right)       Thickened:  1, 2, 3, 4, 5       >3mm:  1, 2, 3, 4, 5     MusculoSkeletal Exam (Bilateral)       Gait and Stance (Bl):   abducted in a pronated, bilateral.       ROM (Bl): "    Ankle and pedal joint range of motion is somewhat limited but nontender crepitus free       Muscle Strength (Bl):  symmetrical 5/5       Subluxed Digits (Bl):   lateral deviation of the hallux with moderate bunion deformity, right       Inflammed Joints (Bl):  no inflammation of joints       Hammertoe Deformity (Bl):  none       Claw Toe Deformity (Bl): none       Medial Turbicle Calc (Bl):  no pain       Gastroc soleus equinus (Bl):  Inability to dorsiflex past neutral position both straight legged and bent knee       Post tibial tendon (Bl):  no soreness noted       Peroneal Tendon (Bl):  no soreness noted       Capsulitis of the MPJs (Bl):  no soreness noted  Additional Musculoskelatal Findings  Moderate discomfort with palpation to the first ray.  No progressive deformity.    Assessment/Plan   Radha was seen today for follow-up and follow-up.    Diagnoses and all orders for this visit:    Hallux valgus, right    DM type 2 with diabetic peripheral neuropathy (CMS/HCC)      Patient returns for routine foot check.  She continues to have pain involving the right foot.  We have discussed this issue previously.  She currently does not have any open wounds or signs of infection.  No obvious signs of inflammation, but she does have limitation on a day-to-day basis.  She is also unable to wear certain shoes because of the discomfort.  She would like to discuss treatment options.  Previous imaging was reviewed showing large bunion deformity.  Given her most recent A1c, I do not feel that surgery is the most appropriate option at this time.  I would need her to have improved glycemic control prior to proceeding with surgery.  I have recommended that she follow-up with me in 2 months.  I would like her to obtain an A1c prior to seeing me.  May consider minimally invasive bumpectomy at that time.  We did review diabetic foot care and importance of proper blood sugar control.  She is to call with any additional issues or  concerns.    No orders of the defined types were placed in this encounter.         Note is dictated utilizing voice recognition software. Unfortunately this leads to occasional typographical errors. I apologize in advance if the situation occurs. If questions occur please do not hesitate to call our office.

## 2020-02-17 RX ORDER — INSULIN GLARGINE 100 [IU]/ML
INJECTION, SOLUTION SUBCUTANEOUS
Qty: 45 ML | Refills: 3 | Status: SHIPPED | OUTPATIENT
Start: 2020-02-17 | End: 2020-09-08

## 2020-02-21 RX ORDER — LEVOTHYROXINE SODIUM 0.05 MG/1
TABLET ORAL
Qty: 90 TABLET | Refills: 4 | Status: SHIPPED | OUTPATIENT
Start: 2020-02-21 | End: 2020-07-22

## 2020-04-23 ENCOUNTER — TELEPHONE (OUTPATIENT)
Dept: ENDOCRINOLOGY | Facility: CLINIC | Age: 65
End: 2020-04-23

## 2020-04-23 RX ORDER — INSULIN LISPRO 100 [IU]/ML
15 INJECTION, SOLUTION INTRAVENOUS; SUBCUTANEOUS
Qty: 15 ML | Refills: 4 | Status: SHIPPED | OUTPATIENT
Start: 2020-04-23 | End: 2020-11-10

## 2020-05-05 RX ORDER — METFORMIN HYDROCHLORIDE 500 MG/1
TABLET, EXTENDED RELEASE ORAL
Qty: 180 TABLET | Refills: 1 | Status: SHIPPED | OUTPATIENT
Start: 2020-05-05 | End: 2020-12-09

## 2020-06-03 DIAGNOSIS — E11.49 TYPE 2 DIABETES MELLITUS WITH OTHER DIABETIC NEUROLOGICAL COMPLICATION (HCC): Primary | ICD-10-CM

## 2020-06-04 RX ORDER — BLOOD SUGAR DIAGNOSTIC
STRIP MISCELLANEOUS
Qty: 50 EACH | Refills: 15 | Status: SHIPPED | OUTPATIENT
Start: 2020-06-04

## 2020-06-05 DIAGNOSIS — E11.49 TYPE 2 DIABETES MELLITUS WITH OTHER DIABETIC NEUROLOGICAL COMPLICATION (HCC): ICD-10-CM

## 2020-06-05 RX ORDER — BLOOD SUGAR DIAGNOSTIC
STRIP MISCELLANEOUS
Refills: 3 | OUTPATIENT
Start: 2020-06-05

## 2020-06-23 ENCOUNTER — TELEPHONE (OUTPATIENT)
Dept: ENDOCRINOLOGY | Facility: CLINIC | Age: 65
End: 2020-06-23

## 2020-07-12 ENCOUNTER — RESULTS ENCOUNTER (OUTPATIENT)
Dept: ENDOCRINOLOGY | Facility: CLINIC | Age: 65
End: 2020-07-12

## 2020-07-12 DIAGNOSIS — E55.9 VITAMIN D DEFICIENCY: ICD-10-CM

## 2020-07-12 DIAGNOSIS — E11.49 TYPE 2 DIABETES MELLITUS WITH OTHER DIABETIC NEUROLOGICAL COMPLICATION (HCC): ICD-10-CM

## 2020-07-12 DIAGNOSIS — E03.9 HYPOTHYROIDISM, UNSPECIFIED TYPE: ICD-10-CM

## 2020-07-22 ENCOUNTER — OFFICE VISIT (OUTPATIENT)
Dept: ENDOCRINOLOGY | Facility: CLINIC | Age: 65
End: 2020-07-22

## 2020-07-22 VITALS
DIASTOLIC BLOOD PRESSURE: 66 MMHG | SYSTOLIC BLOOD PRESSURE: 136 MMHG | HEART RATE: 98 BPM | BODY MASS INDEX: 46.13 KG/M2 | TEMPERATURE: 98 F | OXYGEN SATURATION: 97 % | HEIGHT: 60 IN | WEIGHT: 235 LBS

## 2020-07-22 DIAGNOSIS — E55.9 VITAMIN D DEFICIENCY: ICD-10-CM

## 2020-07-22 DIAGNOSIS — E11.49 TYPE 2 DIABETES MELLITUS WITH OTHER DIABETIC NEUROLOGICAL COMPLICATION (HCC): Primary | ICD-10-CM

## 2020-07-22 DIAGNOSIS — I10 ESSENTIAL HYPERTENSION: ICD-10-CM

## 2020-07-22 LAB
GLUCOSE BLDC GLUCOMTR-MCNC: 134 MG/DL (ref 70–105)
GLUCOSE BLDC GLUCOMTR-MCNC: 134 MG/DL (ref 70–130)

## 2020-07-22 PROCEDURE — 99214 OFFICE O/P EST MOD 30 MIN: CPT | Performed by: INTERNAL MEDICINE

## 2020-07-22 PROCEDURE — 82962 GLUCOSE BLOOD TEST: CPT | Performed by: INTERNAL MEDICINE

## 2020-07-22 RX ORDER — PRAVASTATIN SODIUM 10 MG
10 TABLET ORAL NIGHTLY
COMMUNITY

## 2020-07-22 RX ORDER — NABUMETONE 500 MG/1
500 TABLET, FILM COATED ORAL NIGHTLY
COMMUNITY

## 2020-07-22 RX ORDER — ACETAMINOPHEN 500 MG
500 TABLET ORAL AS NEEDED
COMMUNITY
End: 2020-09-18 | Stop reason: HOSPADM

## 2020-07-22 NOTE — PATIENT INSTRUCTIONS
Keep up the good work!  Drink plenty of water.  See eye doctor & foot doctor.  Continue exercise.  Continue meds.  Call if blood sugars are running under 100 or over 200.  F/u in 6 months, with labs prior.

## 2020-07-25 NOTE — PROGRESS NOTES
[facility]        Patient Care Team:  Wilson, Mandy Corinne, APRN as PCP - General (Nurse Practitioner)    Chief Complaint:    Chief Complaint   Patient presents with   • Diabetes     type 2         Subjective   Here for diabetes f/u  Blood sugars: forgot to bring  Exercise program: using walker. Doing chair PT  Due for eye exam  Taking vit D weekly    Interval History:     Patient Complaints: Episode of LOC in June ( ? mild CVA ). Taking B 12 shots since June. Feeling better  Patient Denies:  hypoglycemia  History taken from: patient    Review of Systems:   Review of Systems   Eyes: Negative for blurred vision.   Gastrointestinal: Negative for nausea.   Endocrine: Negative for polyuria.   Neurological: Negative for headache.     Lost  17 lb since last visit    Objective     Vital Signs      Vitals:    07/22/20 1106   BP: 136/66   Pulse: 98   Temp: 98 °F (36.7 °C)   SpO2: 97%         Physical Exam:     General Appearance:    Alert, cooperative, in no acute distress. Obese   Head:    Normocephalic, without obvious abnormality, atraumatic   Eyes:            Periorbital edema   Throat:   No oral lesions,  oral mucosa moist. Dentures   Neck:   Acanthosis nigricans,  no thyromegaly, no   carotid bruit   Lungs:     Clear     Heart:    Regular rhythm and normal rate   Chest Wall:    No abnormalities observed   Abdomen:     Normal bowel sounds, soft                 Extremities:   Dane bunions, no edema               Pulses:   Pulses palpable and equal bilaterally   Skin:   Dry   Neurologic:  DTR absent in ankles, vibratory sense 50% in toes, able to feel the 10g monofilament, except toes ( better than 1y ago )          Results Review:    I have reviewed the patient's new clinical results, labs & imaging.    Medication Review:   Prior to Admission medications    Medication Sig Start Date End Date Taking? Authorizing Provider   ACCU-CHEK FASTCLIX LANCETS misc USE TO TEST TWICE A DAY 12/16/19  Yes  Yo Marshall MD   ACCU-CHEK SMARTVIEW test strip USE TO TEST TWICE A DAY 6/4/20  Yes Yo Marshall MD   acetaminophen (TYLENOL) 500 MG tablet Take 500 mg by mouth As Needed for Mild Pain .   Yes Vy Armando MD   clopidogrel (PLAVIX) 75 MG tablet Take 75 mg by mouth Daily. 6/18/19  Yes Vy Armando MD   DULERA 200-5 MCG/ACT inhaler Inhale 2 puffs 2 (Two) Times a Day. 8/29/19  Yes Vy Armando MD   DULoxetine (CYMBALTA) 60 MG capsule Take 60 mg by mouth Daily. 7/30/19  Yes Vy Armando MD   folic acid (FOLVITE) 1 MG tablet Take 1,000 mcg by mouth Daily. 6/18/19  Yes Vy Armando MD   furosemide (LASIX) 40 MG tablet Take 40 mg by mouth 2 (Two) Times a Day. 8/12/19  Yes Vy Armando MD   Insulin Lispro, 1 Unit Dial, (HumaLOG KwikPen) 100 UNIT/ML solution pen-injector Inject 15 Units under the skin into the appropriate area as directed 3 (Three) Times a Day Before Meals. 4/23/20  Yes Yo Marshall MD   Insulin Pen Needle (B-D UF III MINI PEN NEEDLES) 31G X 5 MM misc USE AS DIRECTED 5XDAILY 6/4/20  Yes Yo Marshall MD   Insulin Pen Needle (B-D UF III MINI PEN NEEDLES) 31G X 5 MM misc With to inject insulin Dx:e11.65 6/26/19  Yes Yo Marshall MD   LANTUS SOLOSTAR 100 UNIT/ML injection pen Inject 60 units in am, 66 units QHS dx:e11.65 2/17/20  Yes Yo Marshall MD   LYRICA 150 MG capsule TAKE 1 CAPSULE BY MOUTH TWICE A DAY-5/20 7/1/19  Yes Vy Armando MD   metFORMIN ER (GLUCOPHAGE-XR) 500 MG 24 hr tablet TAKE 1 TABLET BY MOUTH BEFORE BREAKFAST & SUPPER 5/5/20  Yes Yo Marshall MD   nabumetone (RELAFEN) 500 MG tablet Take 500 mg by mouth 3 (Three) Times a Day.   Yes Vy Armando MD   omeprazole (priLOSEC) 20 MG capsule  1/2/20  Yes Provider, Historical, MD   Polyethylene Glycol 3350 (MIRALAX PO) Take  by mouth Daily.   Yes Provider, Historical, MD   pravastatin (PRAVACHOL) 10 MG tablet Take 10 mg by mouth Daily.    Yes ProviderVy MD   Probiotic Product (PROBIOTIC-10 PO) Take  by mouth Daily.   Yes ProviderVy MD   spironolactone (ALDACTONE) 25 MG tablet Take 25 mg by mouth 2 (Two) Times a Day. 12/31/19  Yes Vy Armando MD   vitamin D (ERGOCALCIFEROL) 92038 units capsule capsule TAKE ONE CAPSULE ONCE WEEKLY 8/7/19  Yes Vy Armando MD       Lab Results (most recent)     Procedure Component Value Units Date/Time    POC Glucose [[id number]]  (Abnormal) Collected:  07/22/20 1058    Specimen:  Blood Updated:  07/22/20 1112     Glucose 134 mg/dL      Comment: Serial Number: [id number]:  [id number]       POC Glucose Fingerstick [[id number]]  (Abnormal) Collected:  07/22/20 1055    Specimen:  Blood Updated:  07/22/20 1058     Glucose 134 mg/dL      Comment: ate@8a this morning, 3 hours ago, insulin@ 745a this morning           A1C better @ 8.2%; cr 1.15, BUN 15, K 4.3Ca 9.8, ALT 15 on 6/30/2020.    Assessment/Plan     Radha was seen today for diabetes.    Diagnoses and all orders for this visit:    Type 2 diabetes mellitus with other diabetic neurological complication (CMS/HCC)  -     POC Glucose Fingerstick  -     Hemoglobin A1c; Future  -     Microalbumin / Creatinine Urine Ratio - Urine, Clean Catch; Future  -     Lipid Panel; Future  -     TSH; Future    Vitamin D deficiency  -     Vitamin D 25 Hydroxy; Future    Essential hypertension  -     Comprehensive Metabolic Panel; Future    Other orders  -     POC Glucose    Improving.    Drink plenty of water.  See eye doctor & foot doctor.  Continue exercise.  Continue meds.  Call if blood sugars are running under 100 or over 200.        Yo Marshall MD  07/25/20  19:37

## 2020-07-27 RX ORDER — LANCETS
EACH MISCELLANEOUS
Qty: 102 EACH | Refills: 3 | Status: SHIPPED | OUTPATIENT
Start: 2020-07-27

## 2020-08-18 ENCOUNTER — OFFICE VISIT (OUTPATIENT)
Dept: PODIATRY | Facility: CLINIC | Age: 65
End: 2020-08-18

## 2020-08-18 VITALS
HEART RATE: 102 BPM | SYSTOLIC BLOOD PRESSURE: 123 MMHG | DIASTOLIC BLOOD PRESSURE: 79 MMHG | WEIGHT: 227 LBS | HEIGHT: 60 IN | BODY MASS INDEX: 44.57 KG/M2

## 2020-08-18 DIAGNOSIS — E11.42 DM TYPE 2 WITH DIABETIC PERIPHERAL NEUROPATHY (HCC): ICD-10-CM

## 2020-08-18 DIAGNOSIS — M20.11 HALLUX VALGUS, RIGHT: Primary | ICD-10-CM

## 2020-08-18 PROCEDURE — 99213 OFFICE O/P EST LOW 20 MIN: CPT | Performed by: PODIATRIST

## 2020-08-19 NOTE — PROGRESS NOTES
08/18/2020  Foot and Ankle Surgery - Established Patient/Follow-up  Provider: Dr. Bruce Giles DPM  Location: [facility]    Subjective:  Radha Davis is a 64 y.o. female.     Chief Complaint   Patient presents with   • Right Foot - Follow-up   • Left Foot - Follow-up       HPI: Patient returns for follow-up on her right foot as well as routine diabetic foot check.  She continues to have prominent discomfort involving the right first metatarsal phalangeal joint.  She states that she is tired of dealing with this issue and would like to discuss surgical options.  Her A1c has improved to 8.2%.  She has not noticed any new issues with the feet.  She denies any open wounds or infections.  She is concerned that the deformity and pain will progress causing further limitation.  She is having difficulty wearing shoes.    Allergies   Allergen Reactions   • Penicillin G Hives   • Sulfa Antibiotics Hives       Current Outpatient Medications on File Prior to Visit   Medication Sig Dispense Refill   • Accu-Chek FastClix Lancets misc USE TO TEST TWICE A  each 3   • ACCU-CHEK SMARTVIEW test strip USE TO TEST TWICE A DAY 50 each 15   • acetaminophen (TYLENOL) 500 MG tablet Take 500 mg by mouth As Needed for Mild Pain .     • clopidogrel (PLAVIX) 75 MG tablet Take 75 mg by mouth Daily.  3   • DULERA 200-5 MCG/ACT inhaler Inhale 2 puffs 2 (Two) Times a Day.  5   • DULoxetine (CYMBALTA) 60 MG capsule Take 60 mg by mouth Daily.  1   • folic acid (FOLVITE) 1 MG tablet Take 1,000 mcg by mouth Daily.  3   • furosemide (LASIX) 40 MG tablet Take 40 mg by mouth 2 (Two) Times a Day.  5   • Insulin Lispro, 1 Unit Dial, (HumaLOG KwikPen) 100 UNIT/ML solution pen-injector Inject 15 Units under the skin into the appropriate area as directed 3 (Three) Times a Day Before Meals. 15 mL 4   • Insulin Pen Needle (B-D UF III MINI PEN NEEDLES) 31G X 5 MM misc USE AS DIRECTED 5XDAILY 150 each 3   • Insulin Pen Needle (B-D UF III MINI  "PEN NEEDLES) 31G X 5 MM misc With to inject insulin Dx:e11.65 100 each 2   • LANTUS SOLOSTAR 100 UNIT/ML injection pen Inject 60 units in am, 66 units QHS dx:e11.65 45 mL 3   • LYRICA 150 MG capsule TAKE 1 CAPSULE BY MOUTH TWICE A DAY-5/20  2   • metFORMIN ER (GLUCOPHAGE-XR) 500 MG 24 hr tablet TAKE 1 TABLET BY MOUTH BEFORE BREAKFAST & SUPPER 180 tablet 1   • nabumetone (RELAFEN) 500 MG tablet Take 500 mg by mouth 3 (Three) Times a Day.     • omeprazole (priLOSEC) 20 MG capsule      • Polyethylene Glycol 3350 (MIRALAX PO) Take  by mouth Daily.     • pravastatin (PRAVACHOL) 10 MG tablet Take 10 mg by mouth Daily.     • Probiotic Product (PROBIOTIC-10 PO) Take  by mouth Daily.     • spironolactone (ALDACTONE) 25 MG tablet Take 25 mg by mouth 2 (Two) Times a Day.     • vitamin D (ERGOCALCIFEROL) 55787 units capsule capsule TAKE ONE CAPSULE ONCE WEEKLY  3     No current facility-administered medications on file prior to visit.        Objective   /79   Pulse 102   Ht 152.4 cm (60\")   Wt 103 kg (227 lb)   BMI 44.33 kg/m²     Podiatry Exam       General Appearance:  obese.    Mental Status Exam        Judgement and Insight:  Intact       Orientation:  Oriented to time, place, and person  Cardiovascular (Bilateral)       Dorsalis Pedis Pulse (BL):  2/4       Posterior Tibialis Pulse (BL):  2/4       Capillary Filling Time (BL):  1-3 Seconds       Edema (BL):  No Edema  Dermatological Exam       Temperature:  warm to warm  Skin:   No significant erythema and calor  Neurological Exam (Bilateral)       Paresthesia (Bl):  negative       Patella DTRs (Bl):  symmetric       Achilles DTRs (Bl):  symmetric       Tinel over Tarsal Tunnel (Bl):  negative  Monofilament Test (Bl):   not intact       Diabetic Risk (Bl):  Loss of protective sensation with no weakness deformity callus or pre-ulcer  Hypertrophic Nail Description (Left)       Thickened:  1, 2, 3, 4, 5       >3mm:  1, 2, 3, 4, 5       Onychomycosis: "  1  Hypertrophic Nail Description (Right)       Thickened:  1, 2, 3, 4, 5       >3mm:  1, 2, 3, 4, 5     MusculoSkeletal Exam (Bilateral)       Gait and Stance (Bl):   abducted in a pronated, bilateral.       ROM (Bl):    Ankle and pedal joint range of motion is somewhat limited but nontender crepitus free       Muscle Strength (Bl):  symmetrical 5/5       Subluxed Digits (Bl):   lateral deviation of the hallux with moderate bunion deformity, right       Inflammed Joints (Bl):  no inflammation of joints       Hammertoe Deformity (Bl):  none       Claw Toe Deformity (Bl): none       Medial Turbicle Calc (Bl):  no pain       Gastroc soleus equinus (Bl):  Inability to dorsiflex past neutral position both straight legged and bent knee       Post tibial tendon (Bl):  no soreness noted       Peroneal Tendon (Bl):  no soreness noted       Capsulitis of the MPJs (Bl):  no soreness noted  Additional Musculoskelatal Findings  Moderate discomfort with palpation to the first ray.  No progressive deformity.    Assessment/Plan   Radha was seen today for follow-up and follow-up.    Diagnoses and all orders for this visit:    Hallux valgus, right    DM type 2 with diabetic peripheral neuropathy (CMS/HCC)      Patient returns for routine diabetic foot check and continued discomfort and deformity involving the right foot.  We have discussed treatment options involving the bunion deformity in the past.  She states that she has worked diligently to improve her blood sugars and would like to proceed with surgery if possible.  Given that she is done well and there is no further issues with her feet, we did discuss minimally invasive approach for bunionectomy with distal metatarsal osteotomy.  I reviewed the procedure, risks, goals, and recovery at length.  She does understand that she will require approximately 2 to 4 weeks of nonweightbearing activity after the surgery.  She does understand that she is at marginally increased risk  given the diabetes and increased A1c.  We did review appropriate diabetic foot care and the importance of continued glycemic control.  Patient would like to proceed with surgery in the near future.    No orders of the defined types were placed in this encounter.         Note is dictated utilizing voice recognition software. Unfortunately this leads to occasional typographical errors. I apologize in advance if the situation occurs. If questions occur please do not hesitate to call our office.

## 2020-08-28 ENCOUNTER — TELEPHONE (OUTPATIENT)
Dept: PODIATRY | Facility: CLINIC | Age: 65
End: 2020-08-28

## 2020-09-02 PROBLEM — M20.11 HALLUX VALGUS, RIGHT: Status: ACTIVE | Noted: 2020-09-02

## 2020-09-08 RX ORDER — INSULIN GLARGINE 100 [IU]/ML
INJECTION, SOLUTION SUBCUTANEOUS
Qty: 45 ML | Refills: 3 | Status: SHIPPED | OUTPATIENT
Start: 2020-09-08

## 2020-09-14 RX ORDER — ERGOCALCIFEROL 1.25 MG/1
CAPSULE ORAL
Qty: 12 CAPSULE | Refills: 3 | Status: SHIPPED | OUTPATIENT
Start: 2020-09-14

## 2020-09-16 ENCOUNTER — LAB (OUTPATIENT)
Dept: LAB | Facility: HOSPITAL | Age: 65
End: 2020-09-16

## 2020-09-16 DIAGNOSIS — M20.11 HALLUX VALGUS, RIGHT: ICD-10-CM

## 2020-09-16 LAB — MRSA DNA SPEC QL NAA+PROBE: NORMAL

## 2020-09-16 PROCEDURE — 85027 COMPLETE CBC AUTOMATED: CPT

## 2020-09-16 PROCEDURE — 87641 MR-STAPH DNA AMP PROBE: CPT

## 2020-09-16 PROCEDURE — C9803 HOPD COVID-19 SPEC COLLECT: HCPCS

## 2020-09-16 PROCEDURE — U0004 COV-19 TEST NON-CDC HGH THRU: HCPCS

## 2020-09-16 PROCEDURE — 36415 COLL VENOUS BLD VENIPUNCTURE: CPT

## 2020-09-16 PROCEDURE — 80048 BASIC METABOLIC PNL TOTAL CA: CPT

## 2020-09-17 ENCOUNTER — ANESTHESIA EVENT (OUTPATIENT)
Dept: PERIOP | Facility: HOSPITAL | Age: 65
End: 2020-09-17

## 2020-09-17 LAB
ANION GAP SERPL CALCULATED.3IONS-SCNC: 12 MMOL/L (ref 5–15)
BUN SERPL-MCNC: 8 MG/DL (ref 8–23)
BUN/CREAT SERPL: 8.7 (ref 7–25)
CALCIUM SPEC-SCNC: 9.7 MG/DL (ref 8.6–10.5)
CHLORIDE SERPL-SCNC: 100 MMOL/L (ref 98–107)
CO2 SERPL-SCNC: 23 MMOL/L (ref 22–29)
CREAT SERPL-MCNC: 0.92 MG/DL (ref 0.57–1)
DEPRECATED RDW RBC AUTO: 41.8 FL (ref 37–54)
ERYTHROCYTE [DISTWIDTH] IN BLOOD BY AUTOMATED COUNT: 13.1 % (ref 12.3–15.4)
GFR SERPL CREATININE-BSD FRML MDRD: 74 ML/MIN/1.73
GLUCOSE SERPL-MCNC: 125 MG/DL (ref 65–99)
HCT VFR BLD AUTO: 38 % (ref 34–46.6)
HGB BLD-MCNC: 12.1 G/DL (ref 12–15.9)
MCH RBC QN AUTO: 27.4 PG (ref 26.6–33)
MCHC RBC AUTO-ENTMCNC: 31.8 G/DL (ref 31.5–35.7)
MCV RBC AUTO: 86.2 FL (ref 79–97)
PLATELET # BLD AUTO: 306 10*3/MM3 (ref 140–450)
PMV BLD AUTO: 10.9 FL (ref 6–12)
POTASSIUM SERPL-SCNC: 4.7 MMOL/L (ref 3.5–5.2)
RBC # BLD AUTO: 4.41 10*6/MM3 (ref 3.77–5.28)
SARS-COV-2 RNA NOSE QL NAA+PROBE: NOT DETECTED
SODIUM SERPL-SCNC: 135 MMOL/L (ref 136–145)
WBC # BLD AUTO: 9.19 10*3/MM3 (ref 3.4–10.8)

## 2020-09-17 NOTE — ANESTHESIA PREPROCEDURE EVALUATION
Anesthesia Evaluation     Patient summary reviewed and Nursing notes reviewed   NPO Solid Status: > 8 hours  NPO Liquid Status: > 8 hours           Airway   Mallampati: II  TM distance: >3 FB  Neck ROM: full  No difficulty expected  Dental - normal exam   (+) edentulous    Pulmonary - normal exam   (+) asthma,  Cardiovascular - normal exam    ECG reviewed    (+) hypertension, hyperlipidemia,       Neuro/Psych  (+) TIA, syncope,     GI/Hepatic/Renal/Endo    (+) morbid obesity,  diabetes mellitus,     Musculoskeletal     Abdominal  - normal exam    Bowel sounds: normal.   Substance History      OB/GYN          Other   arthritis,      ROS/Med Hx Other: SR                 Anesthesia Plan    ASA 3     general     intravenous induction

## 2020-09-18 ENCOUNTER — ANESTHESIA (OUTPATIENT)
Dept: PERIOP | Facility: HOSPITAL | Age: 65
End: 2020-09-18

## 2020-09-18 ENCOUNTER — APPOINTMENT (OUTPATIENT)
Dept: GENERAL RADIOLOGY | Facility: HOSPITAL | Age: 65
End: 2020-09-18

## 2020-09-18 ENCOUNTER — HOSPITAL ENCOUNTER (OUTPATIENT)
Facility: HOSPITAL | Age: 65
Setting detail: HOSPITAL OUTPATIENT SURGERY
Discharge: HOME | End: 2020-09-18
Attending: PODIATRIST | Admitting: PODIATRIST

## 2020-09-18 VITALS
RESPIRATION RATE: 16 BRPM | HEART RATE: 83 BPM | BODY MASS INDEX: 45.27 KG/M2 | HEIGHT: 60 IN | TEMPERATURE: 97 F | OXYGEN SATURATION: 97 % | SYSTOLIC BLOOD PRESSURE: 119 MMHG | WEIGHT: 230.6 LBS | DIASTOLIC BLOOD PRESSURE: 64 MMHG

## 2020-09-18 DIAGNOSIS — M20.11 HALLUX VALGUS, RIGHT: ICD-10-CM

## 2020-09-18 LAB
GLUCOSE BLDC GLUCOMTR-MCNC: 157 MG/DL (ref 70–105)
GLUCOSE BLDC GLUCOMTR-MCNC: 157 MG/DL (ref 70–105)

## 2020-09-18 PROCEDURE — 25010000003 LIDOCAINE 1 % SOLUTION 20 ML VIAL: Performed by: PODIATRIST

## 2020-09-18 PROCEDURE — C1713 ANCHOR/SCREW BN/BN,TIS/BN: HCPCS | Performed by: PODIATRIST

## 2020-09-18 PROCEDURE — 25010000002 FENTANYL CITRATE (PF) 100 MCG/2ML SOLUTION: Performed by: NURSE ANESTHETIST, CERTIFIED REGISTERED

## 2020-09-18 PROCEDURE — 25010000002 PROPOFOL 10 MG/ML EMULSION: Performed by: NURSE ANESTHETIST, CERTIFIED REGISTERED

## 2020-09-18 PROCEDURE — 73660 X-RAY EXAM OF TOE(S): CPT

## 2020-09-18 PROCEDURE — 76000 FLUOROSCOPY <1 HR PHYS/QHP: CPT

## 2020-09-18 PROCEDURE — 28296 COR HLX VLGS DSTL MTAR OSTEO: CPT | Performed by: PODIATRIST

## 2020-09-18 PROCEDURE — 82962 GLUCOSE BLOOD TEST: CPT

## 2020-09-18 PROCEDURE — 25010000002 ONDANSETRON PER 1 MG: Performed by: NURSE ANESTHETIST, CERTIFIED REGISTERED

## 2020-09-18 PROCEDURE — S0260 H&P FOR SURGERY: HCPCS | Performed by: PODIATRIST

## 2020-09-18 DEVICE — IMPLANTABLE DEVICE: Type: IMPLANTABLE DEVICE | Site: FOOT | Status: FUNCTIONAL

## 2020-09-18 RX ORDER — ACETAMINOPHEN 650 MG/1
650 SUPPOSITORY RECTAL ONCE AS NEEDED
Status: DISCONTINUED | OUTPATIENT
Start: 2020-09-18 | End: 2020-09-18 | Stop reason: HOSPADM

## 2020-09-18 RX ORDER — ONDANSETRON 2 MG/ML
INJECTION INTRAMUSCULAR; INTRAVENOUS AS NEEDED
Status: DISCONTINUED | OUTPATIENT
Start: 2020-09-18 | End: 2020-09-18 | Stop reason: SURG

## 2020-09-18 RX ORDER — ACETAMINOPHEN 325 MG/1
650 TABLET ORAL ONCE AS NEEDED
Status: DISCONTINUED | OUTPATIENT
Start: 2020-09-18 | End: 2020-09-18 | Stop reason: HOSPADM

## 2020-09-18 RX ORDER — PHENYLEPHRINE HCL IN 0.9% NACL 0.5 MG/5ML
SYRINGE (ML) INTRAVENOUS AS NEEDED
Status: DISCONTINUED | OUTPATIENT
Start: 2020-09-18 | End: 2020-09-18 | Stop reason: SURG

## 2020-09-18 RX ORDER — CLINDAMYCIN PHOSPHATE 900 MG/50ML
900 INJECTION, SOLUTION INTRAVENOUS ONCE
Status: COMPLETED | OUTPATIENT
Start: 2020-09-18 | End: 2020-09-18

## 2020-09-18 RX ORDER — HYDROCODONE BITARTRATE AND ACETAMINOPHEN 7.5; 325 MG/1; MG/1
1 TABLET ORAL EVERY 6 HOURS PRN
Status: DISCONTINUED | OUTPATIENT
Start: 2020-09-18 | End: 2020-09-18 | Stop reason: HOSPADM

## 2020-09-18 RX ORDER — SODIUM CHLORIDE, SODIUM LACTATE, POTASSIUM CHLORIDE, CALCIUM CHLORIDE 600; 310; 30; 20 MG/100ML; MG/100ML; MG/100ML; MG/100ML
9 INJECTION, SOLUTION INTRAVENOUS CONTINUOUS PRN
Status: DISCONTINUED | OUTPATIENT
Start: 2020-09-18 | End: 2020-09-18 | Stop reason: HOSPADM

## 2020-09-18 RX ORDER — ONDANSETRON 2 MG/ML
4 INJECTION INTRAMUSCULAR; INTRAVENOUS ONCE AS NEEDED
Status: DISCONTINUED | OUTPATIENT
Start: 2020-09-18 | End: 2020-09-18 | Stop reason: HOSPADM

## 2020-09-18 RX ORDER — PROPOFOL 10 MG/ML
VIAL (ML) INTRAVENOUS AS NEEDED
Status: DISCONTINUED | OUTPATIENT
Start: 2020-09-18 | End: 2020-09-18 | Stop reason: SURG

## 2020-09-18 RX ORDER — FENTANYL CITRATE 50 UG/ML
50 INJECTION, SOLUTION INTRAMUSCULAR; INTRAVENOUS
Status: DISCONTINUED | OUTPATIENT
Start: 2020-09-18 | End: 2020-09-18 | Stop reason: HOSPADM

## 2020-09-18 RX ORDER — PROMETHAZINE HYDROCHLORIDE 25 MG/1
25 TABLET ORAL ONCE AS NEEDED
Status: DISCONTINUED | OUTPATIENT
Start: 2020-09-18 | End: 2020-09-18 | Stop reason: HOSPADM

## 2020-09-18 RX ORDER — FENTANYL CITRATE 50 UG/ML
INJECTION, SOLUTION INTRAMUSCULAR; INTRAVENOUS AS NEEDED
Status: DISCONTINUED | OUTPATIENT
Start: 2020-09-18 | End: 2020-09-18 | Stop reason: SURG

## 2020-09-18 RX ORDER — HYDROCODONE BITARTRATE AND ACETAMINOPHEN 7.5; 325 MG/1; MG/1
1 TABLET ORAL EVERY 6 HOURS PRN
Qty: 28 TABLET | Refills: 0 | Status: SHIPPED | OUTPATIENT
Start: 2020-09-18 | End: 2020-10-01

## 2020-09-18 RX ORDER — HYDROMORPHONE HCL 110MG/55ML
0.2 PATIENT CONTROLLED ANALGESIA SYRINGE INTRAVENOUS
Status: DISCONTINUED | OUTPATIENT
Start: 2020-09-18 | End: 2020-09-18 | Stop reason: HOSPADM

## 2020-09-18 RX ORDER — SODIUM CHLORIDE 0.9 % (FLUSH) 0.9 %
10 SYRINGE (ML) INJECTION AS NEEDED
Status: DISCONTINUED | OUTPATIENT
Start: 2020-09-18 | End: 2020-09-18 | Stop reason: HOSPADM

## 2020-09-18 RX ORDER — IPRATROPIUM BROMIDE AND ALBUTEROL SULFATE 2.5; .5 MG/3ML; MG/3ML
3 SOLUTION RESPIRATORY (INHALATION) ONCE AS NEEDED
Status: DISCONTINUED | OUTPATIENT
Start: 2020-09-18 | End: 2020-09-18 | Stop reason: HOSPADM

## 2020-09-18 RX ORDER — SODIUM CHLORIDE 0.9 % (FLUSH) 0.9 %
10 SYRINGE (ML) INJECTION EVERY 12 HOURS SCHEDULED
Status: DISCONTINUED | OUTPATIENT
Start: 2020-09-18 | End: 2020-09-18 | Stop reason: HOSPADM

## 2020-09-18 RX ADMIN — SODIUM CHLORIDE, SODIUM LACTATE, POTASSIUM CHLORIDE, AND CALCIUM CHLORIDE: .6; .31; .03; .02 INJECTION, SOLUTION INTRAVENOUS at 09:14

## 2020-09-18 RX ADMIN — FENTANYL CITRATE 50 MCG: 50 INJECTION, SOLUTION INTRAMUSCULAR; INTRAVENOUS at 09:25

## 2020-09-18 RX ADMIN — HYDROCODONE BITARTRATE AND ACETAMINOPHEN 1 TABLET: 7.5; 325 TABLET ORAL at 11:50

## 2020-09-18 RX ADMIN — PROPOFOL 200 MG: 10 INJECTION, EMULSION INTRAVENOUS at 09:21

## 2020-09-18 RX ADMIN — ONDANSETRON 4 MG: 2 INJECTION INTRAMUSCULAR; INTRAVENOUS at 10:28

## 2020-09-18 RX ADMIN — FENTANYL CITRATE 50 MCG: 50 INJECTION, SOLUTION INTRAMUSCULAR; INTRAVENOUS at 09:34

## 2020-09-18 RX ADMIN — PHENYLEPHRINE HYDROCHLORIDE 100 MCG: 10 INJECTION INTRAVENOUS at 09:40

## 2020-09-18 RX ADMIN — PHENYLEPHRINE HYDROCHLORIDE 100 MCG: 10 INJECTION INTRAVENOUS at 09:31

## 2020-09-18 RX ADMIN — CLINDAMYCIN PHOSPHATE 900 MG: 900 INJECTION, SOLUTION INTRAVENOUS at 09:26

## 2020-09-18 RX ADMIN — PHENYLEPHRINE HYDROCHLORIDE 150 MCG: 10 INJECTION INTRAVENOUS at 09:45

## 2020-09-18 NOTE — H&P
09/18/20   Foot and Ankle Surgery - Consult  Provider: Dr. Bruce Giles DPM  Location: [facility]    Subjective:  Radha Davis is a 64 y.o. female.     Cc: Right foot pain    HPI: Patient presents for surgery involving her right foot.  She continues to have pain with weightbearing activities.  No additional issues today    Allergies   Allergen Reactions   • Penicillin G Hives   • Sulfa Antibiotics Hives       Past Medical History:   Diagnosis Date   • Arthritis    • Asthma    • Fainting     July 2020   • Fibromyalgia    • Hypertension 7/30/2018   • Hypothyroidism 11/27/2018   • Spinal stenosis    • TIA (transient ischemic attack) 2019   • Type 2 diabetes mellitus with hyperglycemia (CMS/[facility]) 7/30/2018   • Vitamin D deficiency 7/30/2018       Past Surgical History:   Procedure Laterality Date   • CARPAL TUNNEL RELEASE Left    • CHOLECYSTECTOMY     • HYSTERECTOMY         Family History   Problem Relation Age of Onset   • Diabetes Mother    • Hypertension Mother        Social History     Socioeconomic History   • Marital status:      Spouse name: Not on file   • Number of children: Not on file   • Years of education: Not on file   • Highest education level: Not on file   Tobacco Use   • Smoking status: Never Smoker   • Smokeless tobacco: Never Used   Substance and Sexual Activity   • Alcohol use: No     Frequency: Never   • Drug use: No   • Sexual activity: Defer          Current Facility-Administered Medications:   •  clindamycin (CLEOCIN) 900 mg in sodium chloride 0.9% 50 mL IVPB (premix), 900 mg, Intravenous, Once, SETH Giles DPM  •  lactated ringers infusion, 9 mL/hr, Intravenous, Continuous PRN, AngeliqueAmy, CRNA  •  sodium chloride 0.9 % flush 10 mL, 10 mL, Intravenous, Q12H, Angelique, Amy, CRNA  •  sodium chloride 0.9 % flush 10 mL, 10 mL, Intravenous, PRN, Angelique, Amy, CRNA    Review of Systems:  General: Denies fever, chills, fatigue, and weakness.  Eyes: Denies vision loss,  "blurry vision, and excessive redness.  ENT: Denies hearing issues and difficulty swallowing.  Cardiovascular: Denies palpitations, chest pain, or syncopal episodes.  Respiratory: Denies shortness of breath, wheezing, and coughing.  GI: Denies abdominal pain, nausea, and vomiting.   : Denies frequency, hematuria, and urgency.  Musculoskeletal: + Right foot pain  Derm: Denies rash, open wounds, or suspicious lesions.  Neuro: Denies headaches, numbness, loss of coordination, and tremors.  Psych: Denies anxiety and depression.  Endocrine: Denies temperature intolerance and changes in appetite.  Heme: Denies bleeding disorders or abnormal bruising.     Objective   Ht 152.4 cm (60\")   Wt 102 kg (225 lb)   BMI 43.94 kg/m²     Podiatry Exam       General Appearance:  obese.    Mental Status Exam        Judgement and Insight:  Intact       Orientation:  Oriented to time, place, and person  Cardiovascular (Bilateral)       Dorsalis Pedis Pulse (BL):  2/4       Posterior Tibialis Pulse (BL):  2/4       Capillary Filling Time (BL):  1-3 Seconds       Edema (BL):  No Edema  Dermatological Exam       Temperature:  warm to warm  Skin:   No significant erythema and calor  Neurological Exam (Bilateral)       Paresthesia (Bl):  negative       Patella DTRs (Bl):  symmetric       Achilles DTRs (Bl):  symmetric       Tinel over Tarsal Tunnel (Bl):  negative  Monofilament Test (Bl):   not intact       Diabetic Risk (Bl):  Loss of protective sensation with no weakness deformity callus or pre-ulcer  Hypertrophic Nail Description (Left)       Thickened:  1, 2, 3, 4, 5       >3mm:  1, 2, 3, 4, 5       Onychomycosis:  1  Hypertrophic Nail Description (Right)       Thickened:  1, 2, 3, 4, 5       >3mm:  1, 2, 3, 4, 5     MusculoSkeletal Exam (Bilateral)       Gait and Stance (Bl):   abducted in a pronated, bilateral.       ROM (Bl):    Ankle and pedal joint range of motion is somewhat limited but nontender crepitus free       Muscle " Strength (Bl):  symmetrical 5/5       Subluxed Digits (Bl):   lateral deviation of the hallux with moderate bunion deformity, right       Inflammed Joints (Bl):  no inflammation of joints       Hammertoe Deformity (Bl):  none       Claw Toe Deformity (Bl): none       Medial Turbicle Calc (Bl):  no pain       Gastroc soleus equinus (Bl):  Inability to dorsiflex past neutral position both straight legged and bent knee       Post tibial tendon (Bl):  no soreness noted       Peroneal Tendon (Bl):  no soreness noted       Capsulitis of the MPJs (Bl):  no soreness noted  Additional Musculoskelatal Findings  Moderate discomfort with palpation to the first ray.  No progressive deformity.          Results from last 7 days   Lab Units 09/16/20  1603   WBC 10*3/mm3 9.19   HEMOGLOBIN g/dL 12.1   HEMATOCRIT % 38.0   PLATELETS 10*3/mm3 306       Assessment/Plan   Patient Active Problem List   Diagnosis   • Hypertension   • Hypothyroidism   • Type 2 diabetes mellitus with hyperglycemia (CMS/[facility])   • Vitamin D deficiency   • Type 2 diabetes mellitus with other diabetic neurological complication (CMS/[facility])   • Pain of foot   • Onychodystrophy   • Arthritis of foot   • Morbidly obese (CMS/HCC)   • Hallux valgus, right     Patient continues to have pain to the right foot.  We will proceed with minimally invasive bunionectomy today.  Patient is to remain off weightbearing for 2 weeks.  I will see her at that time for follow-up.    Thank you for the consultation and allowing me to participate in this patient's care. Please call with any additional questions or concerns.     Note is dictated utilizing voice recognition software. Unfortunately this leads to occasional typographical errors. I apologize in advance if the situation occurs. If questions occur please do not hesitate to call our office.

## 2020-09-18 NOTE — ANESTHESIA POSTPROCEDURE EVALUATION
Patient: Radha Davis    Procedure Summary     Date: 09/18/20 Room / Location: [facility] OR 08 / [facility] MAIN OR    Anesthesia Start: 0915 Anesthesia Stop: 1042    Procedure: BUNIONECTOMY DEEPA -minimally invasive approach (Right Foot) Diagnosis:       Hallux valgus, right      (Hallux valgus, right [M20.11])    Surgeon: SETH Giles DPM Provider: Evens Fitch MD    Anesthesia Type: general ASA Status: 3          Anesthesia Type: general    Vitals  Vitals Value Taken Time   /61 09/18/20 1123   Temp 97.1 °F (36.2 °C) 09/18/20 1121   Pulse 89 09/18/20 1122   Resp 13 09/18/20 1121   SpO2 94 % 09/18/20 1122   Vitals shown include unvalidated device data.        Post Anesthesia Care and Evaluation    Patient location during evaluation: PACU  Patient participation: complete - patient participated  Level of consciousness: awake  Pain scale: See nurse's notes for pain score.  Pain management: adequate  Airway patency: patent  Anesthetic complications: No anesthetic complications  PONV Status: none  Cardiovascular status: acceptable  Respiratory status: acceptable  Hydration status: acceptable    Comments: Patient seen and examined postoperatively; vital signs stable; SpO2 greater than or equal to 90%; cardiopulmonary status stable; nausea/vomiting adequately controlled; pain adequately controlled; no apparent anesthesia complications; patient discharged from anesthesia care when discharge criteria were met

## 2020-09-18 NOTE — ANESTHESIA PROCEDURE NOTES
Airway  Urgency: elective    Date/Time: 9/18/2020 9:23 AM  Airway not difficult    General Information and Staff    Patient location during procedure: OR  Anesthesiologist: Evens Fitch MD  CRNA: Shannon Chiu CRNA    Indications and Patient Condition  Indications for airway management: airway protection    Preoxygenated: yes  Mask difficulty assessment: 1 - vent by mask    Final Airway Details  Final airway type: supraglottic airway      Successful airway: LMA  Size 4    Number of attempts at approach: 1  Assessment: lips, teeth, and gum same as pre-op

## 2020-09-18 NOTE — OP NOTE
Operative Note   Foot and Ankle Surgery   Provider: Dr. Bruce Giles   Location: [facility]      Procedure:  1.  Minimally invasive bunionectomy with distal metatarsal osteotomy, right foot    Pre-operative Diagnosis:   1.  Hallux valgus, right foot    Post-operative Diagnosis: Same    Surgeon: Bruce Giles    Assistant: Maycol Arenas, PGY 3    Anesthesia: General    Implants: 3.0 mm [employer] cannulated screws x2    Findings: No unexpected findings    Specimen: None    Blood Loss: Less than 5cc    Complications: None    Post Op Plan: Discharge home.  Nonweightbearing activity to the right foot.  Follow-up with me in 2 weeks.    Summary:    Patient is a pleasant 64-year-old female who has been seen in office for issues involving her right foot.  We did discuss diagnosis and imaging in office.  She has tried multiple conservative treatments including various shoes and offloading devices without any significant improvement.  She states that she is tired of dealing with this pain and would like to proceed with surgery.  We did discuss the need for off weightbearing after surgery.  She does understand that there may be residual deformity and risk of recurrence despite surgery.    Procedure, risks, complications, and goals were discussed with the patient at bedside.  Risks include but are not limited to infection, complications from anesthesia (including death), chronic pain or numbness, hematoma/seroma, deep vein thrombosis, wound complications, and potential for additional surgical procedures.  Patient understands and elects to proceed with surgery at this time. Informed consent was obtained before proceeding to the operating suite.  All questions were answered to the patient's satisfaction. No guarantees or assurances were given or implied.    Procedure:     Patient was brought to the operating room and placed in the operative table in supine position.  Once adequate general anesthesia was administered,  a pneumatic tourniquet was placed about the patient's right ankle.  A preoperative block was performed utilizing 20 cc of 1% lidocaine plain.  The lower extremity was scrubbed prepped and draped in usual sterile fashion.  Limb was elevated and exsanguinated pneumatic tourniquet was inflated to 250 mmHg.  A formal timeout was conducted prior skin incision.     Attention was initially directed to the 1st interspace.  A linear longitudinal incision was performed over the lateral aspect of the first metatarsophalangeal joint region.  Sharp dissection was performed to the level of the adductor hallucis tendon the tendon was identified and isolated.  The tendon was transected using a #15 blade.  The fibular sesamoid was freed from the underlying connective tissue.  Once the lateral release was fully completed, that hallux was noted to drift into a more medial corrected position.     Attention was then directed to the medial aspect of the foot.  Under fluoroscopic guidance, the surgical neck of the first metatarsal was identified.  An incision was performed at this level.  Blunt dissection was performed to the level of bone preserving the dorsal medial cutaneous nerve.  With retraction in place, an osteotomy was performed perpendicular to the second metatarsal and the weightbearing surface.  Osteotomy was performed with use of fluoroscopy throughout the duration of the case.  Once the capital fragment was free, the digit was distracted allowing for lateral translation of the capital fragment and reducing the bunion deformity.  Adequate reduction was achieved and the capital fragment was maintained in place with a 0.062 wire which was advanced from distal to proximal within the first metatarsal cancellus bone immediately adjacent to the capital fragment.  Definitive fixation was achieved with two 3.0mm headless cannulated Steinberg medical screws through a small stab incision which was performed to the medial aspect of the  first metatarsal.  Blunt dissection was performed to bone.  A guidewire was placed from proximal to distal across the osteotomy.  The screws were placed without complication.  The guidewires were removed and final images were performed showing excellent reduction of the bunion deformity and able internal fixation.  The wounds were irrigated with copious amounts of normal saline.  The subcutaneous tissues were closed with 4-0 Vicryl.  The skin was reapproximated with 4-0 nylon in a simple interrupted fashion.  A postoperative block was performed utilizing 20 cc of 0.5% Marcaine plain.  The incisions were dressed with Xeroform followed by sterile compressive dressings. The ankle tourniquet was released and a prompt hyperemic response was noted to the digits. The patient tolerated the procedure and anesthesia well.  The patient is transferred from the operating room to the recovery room with neurovascular status intact to the operative limb.      Dr. Bruce Giles, TORREY  [facility]  [phone]    Note is dictated utilizing voice recognition software. Unfortunately this leads to occasional typographical errors. I apologize in advance if the situation occurs. If questions occur please do not hesitate to call our office.

## 2020-09-22 ENCOUNTER — TELEPHONE (OUTPATIENT)
Dept: ENDOCRINOLOGY | Facility: CLINIC | Age: 65
End: 2020-09-22

## 2020-10-01 ENCOUNTER — OFFICE VISIT (OUTPATIENT)
Dept: PODIATRY | Facility: CLINIC | Age: 65
End: 2020-10-01

## 2020-10-01 VITALS
WEIGHT: 230 LBS | SYSTOLIC BLOOD PRESSURE: 152 MMHG | HEART RATE: 98 BPM | BODY MASS INDEX: 45.16 KG/M2 | DIASTOLIC BLOOD PRESSURE: 78 MMHG | HEIGHT: 60 IN

## 2020-10-01 DIAGNOSIS — M20.11 HALLUX VALGUS, RIGHT: Primary | ICD-10-CM

## 2020-10-01 PROCEDURE — 99024 POSTOP FOLLOW-UP VISIT: CPT | Performed by: PODIATRIST

## 2020-10-01 RX ORDER — INSULIN DEGLUDEC INJECTION 100 U/ML
INJECTION, SOLUTION SUBCUTANEOUS
COMMUNITY
Start: 2020-08-07

## 2020-10-01 NOTE — PROGRESS NOTES
10/01/2020  Foot and Ankle Surgery - Established Patient/Follow-up  Provider: Dr. Bruce Giles, TORREY  Location: [facility]    Subjective:  Radha Davis is a 64 y.o. female.     Chief Complaint   Patient presents with   • Right Foot - Follow-up, Post-op       HPI: Patient returns 2 weeks after bunionectomy.  She states that she is doing relatively well.  She has had mild discomfort but has been able to remain off weightbearing.  No other issues today.    Allergies   Allergen Reactions   • Penicillin G Hives   • Sulfa Antibiotics Hives       Current Outpatient Medications on File Prior to Visit   Medication Sig Dispense Refill   • Accu-Chek FastClix Lancets misc USE TO TEST TWICE A  each 3   • ACCU-CHEK SMARTVIEW test strip USE TO TEST TWICE A DAY 50 each 15   • clopidogrel (PLAVIX) 75 MG tablet Take 75 mg by mouth Daily. LD 9/15  3   • DULERA 200-5 MCG/ACT inhaler Inhale 2 puffs As Needed.  5   • DULoxetine (CYMBALTA) 60 MG capsule Take 60 mg by mouth Daily.  1   • folic acid (FOLVITE) 1 MG tablet Take 1,000 mcg by mouth Daily.  3   • furosemide (LASIX) 40 MG tablet Take 40 mg by mouth 2 (Two) Times a Day.  5   • Insulin Lispro, 1 Unit Dial, (HumaLOG KwikPen) 100 UNIT/ML solution pen-injector Inject 15 Units under the skin into the appropriate area as directed 3 (Three) Times a Day Before Meals. (Patient taking differently: Inject 15 Units under the skin into the appropriate area as directed 3 (Three) Times a Day Before Meals. Hold DOS) 15 mL 4   • Insulin Pen Needle (B-D UF III MINI PEN NEEDLES) 31G X 5 MM misc USE AS DIRECTED 5XDAILY 150 each 3   • Insulin Pen Needle (B-D UF III MINI PEN NEEDLES) 31G X 5 MM misc With to inject insulin Dx:e11.65 100 each 2   • LANTUS SOLOSTAR 100 UNIT/ML injection pen INJECT 60 UNITS SUB-Q IN THE MORNING AND INJECT 66 UNITS AT BEDTIME (Patient taking differently: Inject  under the skin into the appropriate area as directed. INJECT 60 UNITS SUB-Q IN THE MORNING  "AND INJECT 66 UNITS AT BEDTIME  Hold DOS) 45 mL 3   • LYRICA 150 MG capsule Take 150 mg by mouth Every Night.  2   • metFORMIN ER (GLUCOPHAGE-XR) 500 MG 24 hr tablet TAKE 1 TABLET BY MOUTH BEFORE BREAKFAST & SUPPER (Patient taking differently: Take 500 mg by mouth Daily With Breakfast. LD 9/16) 180 tablet 1   • nabumetone (RELAFEN) 500 MG tablet Take 500 mg by mouth Every Night.     • omeprazole (priLOSEC) 20 MG capsule Take 20 mg by mouth 2 (two) times a day.     • Polyethylene Glycol 3350 (MIRALAX PO) Take  by mouth Daily.     • pravastatin (PRAVACHOL) 10 MG tablet Take 10 mg by mouth Every Night.     • Probiotic Product (PROBIOTIC-10 PO) Take  by mouth Daily.     • spironolactone (ALDACTONE) 25 MG tablet Take 25 mg by mouth 2 (Two) Times a Day.     • Tresiba FlexTouch 100 UNIT/ML solution pen-injector injection INJECT 60 UNITS TWICE DAILY DX E11.65     • vitamin D (ERGOCALCIFEROL) 1.25 MG (44023 UT) capsule capsule TAKE 1 CAPSULE BY MOUTH WEEKLY 12 capsule 3   • [DISCONTINUED] HYDROcodone-acetaminophen (NORCO) 7.5-325 MG per tablet Take 1 tablet by mouth Every 6 (Six) Hours As Needed for Moderate Pain  (Pain). 28 tablet 0     No current facility-administered medications on file prior to visit.        Objective   /78   Pulse 98   Ht 152.4 cm (60\")   Wt 104 kg (230 lb)   LMP  (LMP Unknown)   BMI 44.92 kg/m²     Podiatry Exam       General Appearance:  obese.    Mental Status Exam        Judgement and Insight:  Intact       Orientation:  Oriented to time, place, and person  Cardiovascular (Bilateral)       Dorsalis Pedis Pulse (BL):  2/4       Posterior Tibialis Pulse (BL):  2/4       Capillary Filling Time (BL):  1-3 Seconds       Edema (BL):  No Edema  Dermatological Exam       Temperature:  warm to warm  Skin: Incision sites are dry and stable with intact sutures.  No evidence of dehiscence or infection  Neurological Exam (Bilateral)       Paresthesia (Bl):  negative       Patella DTRs (Bl): "  symmetric       Achilles DTRs (Bl):  symmetric       Tinel over Tarsal Tunnel (Bl):  negative  Monofilament Test (Bl):   not intact       Diabetic Risk (Bl):  Loss of protective sensation with no weakness deformity callus or pre-ulcer  Hypertrophic Nail Description (Left)       Thickened:  1, 2, 3, 4, 5       >3mm:  1, 2, 3, 4, 5       Onychomycosis:  1  Hypertrophic Nail Description (Right)       Thickened:  1, 2, 3, 4, 5       >3mm:  1, 2, 3, 4, 5     MusculoSkeletal Exam (Bilateral)       Gait and Stance (Bl):   abducted in a pronated, bilateral.       ROM (Bl):    Ankle and pedal joint range of motion is somewhat limited but nontender crepitus free       Muscle Strength (Bl):  symmetrical 5/5       Subluxed Digits (Bl): Decreased bunion deformity with rectus alignment of the great toe       Inflammed Joints (Bl):  no inflammation of joints       Hammertoe Deformity (Bl):  none       Claw Toe Deformity (Bl): none       Medial Turbicle Calc (Bl):  no pain       Gastroc soleus equinus (Bl):  Inability to dorsiflex past neutral position both straight legged and bent knee       Post tibial tendon (Bl):  no soreness noted       Peroneal Tendon (Bl):  no soreness noted       Capsulitis of the MPJs (Bl):  no soreness noted  Additional Musculoskelatal Findings  Mild discomfort with palpation to the operative site.    Assessment/Plan   Radha was seen today for follow-up and post-op.    Diagnoses and all orders for this visit:    Hallux valgus, right      Patient appears to be doing well after surgery.  Intraoperative imaging was reviewed with patient in office.  Sutures were removed without complication.  I have asked that she start range of motion exercises to the great toe joint.  I would like her to remain off weightbearing with the use of a wheelchair or a knee scooter.  She may remain in the cam boot for added protection.  We did discuss rice therapy and I would like to see her in 2 weeks for reevaluation and  imaging.    No orders of the defined types were placed in this encounter.         Note is dictated utilizing voice recognition software. Unfortunately this leads to occasional typographical errors. I apologize in advance if the situation occurs. If questions occur please do not hesitate to call our office.

## 2020-10-15 ENCOUNTER — OFFICE VISIT (OUTPATIENT)
Dept: PODIATRY | Facility: CLINIC | Age: 65
End: 2020-10-15

## 2020-10-15 VITALS
HEART RATE: 105 BPM | DIASTOLIC BLOOD PRESSURE: 80 MMHG | HEIGHT: 60 IN | BODY MASS INDEX: 45.16 KG/M2 | WEIGHT: 230 LBS | SYSTOLIC BLOOD PRESSURE: 148 MMHG

## 2020-10-15 DIAGNOSIS — M20.11 HALLUX VALGUS, RIGHT: Primary | ICD-10-CM

## 2020-10-15 PROCEDURE — 99024 POSTOP FOLLOW-UP VISIT: CPT | Performed by: PODIATRIST

## 2020-10-16 NOTE — PROGRESS NOTES
10/15/2020  Foot and Ankle Surgery - Established Patient/Follow-up  Provider: Dr. Bruce Giles DPM  Location: [facility]    Subjective:  Radha Davis is a 64 y.o. female.     Chief Complaint   Patient presents with   • Right Foot - Follow-up, Post-op       HPI: Patient returns 4 weeks after bunionectomy.  She is doing quite well.  She states that she did have a small fall the other day and noticed mild discomfort to the right foot.  No other injuries were sustained.  She has tried to remain off weightbearing as much as possible.  She states that she has not been performing range of motion exercises as often as she should.    Allergies   Allergen Reactions   • Penicillin G Hives   • Sulfa Antibiotics Hives       Current Outpatient Medications on File Prior to Visit   Medication Sig Dispense Refill   • Accu-Chek FastClix Lancets misc USE TO TEST TWICE A  each 3   • ACCU-CHEK SMARTVIEW test strip USE TO TEST TWICE A DAY 50 each 15   • clopidogrel (PLAVIX) 75 MG tablet Take 75 mg by mouth Daily. LD 9/15  3   • DULERA 200-5 MCG/ACT inhaler Inhale 2 puffs As Needed.  5   • DULoxetine (CYMBALTA) 60 MG capsule Take 60 mg by mouth Daily.  1   • folic acid (FOLVITE) 1 MG tablet Take 1,000 mcg by mouth Daily.  3   • furosemide (LASIX) 40 MG tablet Take 40 mg by mouth 2 (Two) Times a Day.  5   • Insulin Lispro, 1 Unit Dial, (HumaLOG KwikPen) 100 UNIT/ML solution pen-injector Inject 15 Units under the skin into the appropriate area as directed 3 (Three) Times a Day Before Meals. (Patient taking differently: Inject 15 Units under the skin into the appropriate area as directed 3 (Three) Times a Day Before Meals. Hold DOS) 15 mL 4   • Insulin Pen Needle (B-D UF III MINI PEN NEEDLES) 31G X 5 MM misc With to inject insulin Dx:e11.65 100 each 2   • LANTUS SOLOSTAR 100 UNIT/ML injection pen INJECT 60 UNITS SUB-Q IN THE MORNING AND INJECT 66 UNITS AT BEDTIME (Patient taking differently: Inject  under the skin into  "the appropriate area as directed. INJECT 60 UNITS SUB-Q IN THE MORNING AND INJECT 66 UNITS AT BEDTIME  Hold DOS) 45 mL 3   • LYRICA 150 MG capsule Take 150 mg by mouth Every Night.  2   • metFORMIN ER (GLUCOPHAGE-XR) 500 MG 24 hr tablet TAKE 1 TABLET BY MOUTH BEFORE BREAKFAST & SUPPER (Patient taking differently: Take 500 mg by mouth Daily With Breakfast. LD 9/16) 180 tablet 1   • nabumetone (RELAFEN) 500 MG tablet Take 500 mg by mouth Every Night.     • omeprazole (priLOSEC) 20 MG capsule Take 20 mg by mouth 2 (two) times a day.     • Polyethylene Glycol 3350 (MIRALAX PO) Take  by mouth Daily.     • pravastatin (PRAVACHOL) 10 MG tablet Take 10 mg by mouth Every Night.     • Probiotic Product (PROBIOTIC-10 PO) Take  by mouth Daily.     • spironolactone (ALDACTONE) 25 MG tablet Take 25 mg by mouth 2 (Two) Times a Day.     • Tresiba FlexTouch 100 UNIT/ML solution pen-injector injection INJECT 60 UNITS TWICE DAILY DX E11.65     • vitamin D (ERGOCALCIFEROL) 1.25 MG (86079 UT) capsule capsule TAKE 1 CAPSULE BY MOUTH WEEKLY 12 capsule 3     No current facility-administered medications on file prior to visit.        Objective   /80   Pulse 105   Ht 152.4 cm (60\")   Wt 104 kg (230 lb)   LMP  (LMP Unknown)   BMI 44.92 kg/m²     Podiatry Exam       General Appearance:  obese.    Mental Status Exam        Judgement and Insight:  Intact       Orientation:  Oriented to time, place, and person  Cardiovascular (Bilateral)       Dorsalis Pedis Pulse (BL):  2/4       Posterior Tibialis Pulse (BL):  2/4       Capillary Filling Time (BL):  1-3 Seconds       Edema (BL):  No Edema  Dermatological Exam       Temperature:  warm to warm  Skin: Incision sites are well-healed.  No signs of infection.  Moderate induration to the first interspace incision site with mild tenderness  Neurological Exam (Bilateral)       Paresthesia (Bl):  negative       Patella DTRs (Bl):  symmetric       Achilles DTRs (Bl):  symmetric       Tinel over " Tarsal Tunnel (Bl):  negative  Monofilament Test (Bl):   not intact       Diabetic Risk (Bl):  Loss of protective sensation with no weakness deformity callus or pre-ulcer  Hypertrophic Nail Description (Left)       Thickened:  1, 2, 3, 4, 5       >3mm:  1, 2, 3, 4, 5       Onychomycosis:  1  Hypertrophic Nail Description (Right)       Thickened:  1, 2, 3, 4, 5       >3mm:  1, 2, 3, 4, 5     MusculoSkeletal Exam (Bilateral)       Gait and Stance (Bl):   abducted in a pronated, bilateral.       ROM (Bl):    Ankle and pedal joint range of motion is somewhat limited but nontender crepitus free       Muscle Strength (Bl):  symmetrical 5/5       Subluxed Digits (Bl): Decreased bunion deformity with rectus alignment of the great toe       Inflammed Joints (Bl):  no inflammation of joints       Hammertoe Deformity (Bl):  none       Claw Toe Deformity (Bl): none       Medial Turbicle Calc (Bl):  no pain       Gastroc soleus equinus (Bl):  Inability to dorsiflex past neutral position both straight legged and bent knee       Post tibial tendon (Bl):  no soreness noted       Peroneal Tendon (Bl):  no soreness noted       Capsulitis of the MPJs (Bl):  no soreness noted  Additional Musculoskelatal Findings  Mild discomfort with palpation to the operative site    Assessment/Plan   Diagnoses and all orders for this visit:    1. Hallux valgus, right (Primary)  -     XR Foot 3+ View Right      Patient returns 4 weeks after surgery.  Imaging was reviewed showing stable screw placement and mild interval signs of healing.  Clinically, she does have presence of scar tissue involving the first interspace.  I have recommended that she increase her range of motion and manual therapy exercises.  We did discuss proper techniques in office.  I have asked that she start weightbearing activities in the boot as tolerated.  She is to refrain from excessive activity.  I would like to see her in 4 weeks for reevaluation and imaging.    Orders Placed  This Encounter   Procedures   • XR Foot 3+ View Right     Order Specific Question:   Reason for Exam:     Answer:   right pain post op from about 4 weeks ago RM 15 NWB          Note is dictated utilizing voice recognition software. Unfortunately this leads to occasional typographical errors. I apologize in advance if the situation occurs. If questions occur please do not hesitate to call our office.

## 2020-11-10 RX ORDER — INSULIN LISPRO 100 [IU]/ML
15 INJECTION, SOLUTION INTRAVENOUS; SUBCUTANEOUS
Qty: 30 ML | Refills: 12 | Status: SHIPPED | OUTPATIENT
Start: 2020-11-10

## 2020-11-12 ENCOUNTER — OFFICE VISIT (OUTPATIENT)
Dept: PODIATRY | Facility: CLINIC | Age: 65
End: 2020-11-12

## 2020-11-12 VITALS
SYSTOLIC BLOOD PRESSURE: 153 MMHG | DIASTOLIC BLOOD PRESSURE: 77 MMHG | BODY MASS INDEX: 45.16 KG/M2 | HEIGHT: 60 IN | WEIGHT: 230 LBS | HEART RATE: 98 BPM

## 2020-11-12 DIAGNOSIS — M20.11 HALLUX VALGUS, RIGHT: Primary | ICD-10-CM

## 2020-11-12 PROCEDURE — 99024 POSTOP FOLLOW-UP VISIT: CPT | Performed by: PODIATRIST

## 2020-11-12 NOTE — PROGRESS NOTES
11/12/2020  Foot and Ankle Surgery - Established Patient/Follow-up  Provider: Dr. Bruce Giles DPM  Location: [facility]    Subjective:  Radha Davis is a 64 y.o. female.     Chief Complaint   Patient presents with   • Right Foot - Follow-up       HPI: Patient returns for follow-up on her right foot.  She states that she is doing relatively well.  She denies any significant pain or limitation to the right foot.  She has remained in the cam boot with decreased activity.  She does complain of thickness and intermittent discomfort involving the left great toenail.    Allergies   Allergen Reactions   • Penicillin G Hives   • Sulfa Antibiotics Hives       Current Outpatient Medications on File Prior to Visit   Medication Sig Dispense Refill   • Accu-Chek FastClix Lancets misc USE TO TEST TWICE A  each 3   • ACCU-CHEK SMARTVIEW test strip USE TO TEST TWICE A DAY 50 each 15   • clopidogrel (PLAVIX) 75 MG tablet Take 75 mg by mouth Daily. LD 9/15  3   • DULERA 200-5 MCG/ACT inhaler Inhale 2 puffs As Needed.  5   • DULoxetine (CYMBALTA) 60 MG capsule Take 60 mg by mouth Daily.  1   • folic acid (FOLVITE) 1 MG tablet Take 1,000 mcg by mouth Daily.  3   • furosemide (LASIX) 40 MG tablet Take 40 mg by mouth 2 (Two) Times a Day.  5   • Insulin Lispro, 1 Unit Dial, (HUMALOG) 100 UNIT/ML solution pen-injector INJECT 15 UNITS UNDER THE SKIN INTO THE APPROPRIATE AREA AS DIRECTED 3 (THREE) TIMES A DAY BEFORE MEALS. 30 mL 12   • Insulin Pen Needle (B-D UF III MINI PEN NEEDLES) 31G X 5 MM misc With to inject insulin Dx:e11.65 100 each 2   • LANTUS SOLOSTAR 100 UNIT/ML injection pen INJECT 60 UNITS SUB-Q IN THE MORNING AND INJECT 66 UNITS AT BEDTIME (Patient taking differently: Inject  under the skin into the appropriate area as directed. INJECT 60 UNITS SUB-Q IN THE MORNING AND INJECT 66 UNITS AT BEDTIME  Hold DOS) 45 mL 3   • LYRICA 150 MG capsule Take 150 mg by mouth Every Night.  2   • metFORMIN ER  "(GLUCOPHAGE-XR) 500 MG 24 hr tablet TAKE 1 TABLET BY MOUTH BEFORE BREAKFAST & SUPPER (Patient taking differently: Take 500 mg by mouth Daily With Breakfast. LD 9/16) 180 tablet 1   • nabumetone (RELAFEN) 500 MG tablet Take 500 mg by mouth Every Night.     • omeprazole (priLOSEC) 20 MG capsule Take 20 mg by mouth 2 (two) times a day.     • Polyethylene Glycol 3350 (MIRALAX PO) Take  by mouth Daily.     • pravastatin (PRAVACHOL) 10 MG tablet Take 10 mg by mouth Every Night.     • Probiotic Product (PROBIOTIC-10 PO) Take  by mouth Daily.     • spironolactone (ALDACTONE) 25 MG tablet Take 25 mg by mouth 2 (Two) Times a Day.     • Tresiba FlexTouch 100 UNIT/ML solution pen-injector injection INJECT 60 UNITS TWICE DAILY DX E11.65     • vitamin D (ERGOCALCIFEROL) 1.25 MG (00325 UT) capsule capsule TAKE 1 CAPSULE BY MOUTH WEEKLY 12 capsule 3     No current facility-administered medications on file prior to visit.        Objective   /77   Pulse 98   Ht 152.4 cm (60\")   Wt 104 kg (230 lb)   LMP  (LMP Unknown)   BMI 44.92 kg/m²     Podiatry Exam       General Appearance:  obese.    Mental Status Exam        Judgement and Insight:  Intact       Orientation:  Oriented to time, place, and person  Cardiovascular (Bilateral)       Dorsalis Pedis Pulse (BL):  2/4       Posterior Tibialis Pulse (BL):  2/4       Capillary Filling Time (BL):  1-3 Seconds       Edema (BL):  No Edema  Dermatological Exam       Temperature:  warm to warm  Skin: Incision sites are well-healed.  No signs of infection.  Moderate induration to the first interspace incision site with mild tenderness  Neurological Exam (Bilateral)       Paresthesia (Bl):  negative       Patella DTRs (Bl):  symmetric       Achilles DTRs (Bl):  symmetric       Tinel over Tarsal Tunnel (Bl):  negative  Monofilament Test (Bl):   not intact       Diabetic Risk (Bl):  Loss of protective sensation with no weakness deformity callus or pre-ulcer  Hypertrophic Nail Description " (Left)       Thickened:  1, 2, 3, 4, 5       >3mm:  1, 2, 3, 4, 5       Onychomycosis:  1  Hypertrophic Nail Description (Right)       Thickened:  1, 2, 3, 4, 5       >3mm:  1, 2, 3, 4, 5     MusculoSkeletal Exam (Bilateral)       Gait and Stance (Bl):   abducted in a pronated, bilateral.       ROM (Bl):    Ankle and pedal joint range of motion is somewhat limited but nontender crepitus free       Muscle Strength (Bl):  symmetrical 5/5       Subluxed Digits (Bl): Decreased bunion deformity with rectus alignment of the great toe       Inflammed Joints (Bl):  no inflammation of joints       Hammertoe Deformity (Bl):  none       Claw Toe Deformity (Bl): none       Medial Turbicle Calc (Bl):  no pain       Gastroc soleus equinus (Bl):  Inability to dorsiflex past neutral position both straight legged and bent knee       Post tibial tendon (Bl):  no soreness noted       Peroneal Tendon (Bl):  no soreness noted       Capsulitis of the MPJs (Bl):  no soreness noted  Additional Musculoskelatal Findings  No significant pain with palpation.    Assessment/Plan   Diagnoses and all orders for this visit:    1. Hallux valgus, right (Primary)  -     XR Foot 3+ View Right      Patient presents for follow-up regarding her right foot surgery.  She states that she is doing well.  Imaging was performed today showing interval healing of the fracture site and stable internal fixation.  She states that she does not have any substantial pain.  I have recommended that she proceed with weightbearing activities as needed in the cam boot over the next 2 weeks.  She may transition to a regular shoe after that time.  She does complain of mild discomfort involving the left great toe.  We did discuss conservative treatments for ingrown nail.  She is to monitor and we will discuss at follow-up.  I would like to see her in 4 weeks for reevaluation and imaging of right foot.    Orders Placed This Encounter   Procedures   • XR Foot 3+ View Right      Weight Bearing     Order Specific Question:   Reason for Exam:     Answer:   right foot pain follow up x ray RM 13 WB          Note is dictated utilizing voice recognition software. Unfortunately this leads to occasional typographical errors. I apologize in advance if the situation occurs. If questions occur please do not hesitate to call our office.

## 2020-12-08 DIAGNOSIS — E11.49 TYPE 2 DIABETES MELLITUS WITH OTHER DIABETIC NEUROLOGICAL COMPLICATION (HCC): ICD-10-CM

## 2020-12-09 RX ORDER — METFORMIN HYDROCHLORIDE 500 MG/1
TABLET, EXTENDED RELEASE ORAL
Qty: 180 TABLET | Refills: 3 | Status: SHIPPED | OUTPATIENT
Start: 2020-12-09

## 2020-12-09 RX ORDER — FLURBIPROFEN SODIUM 0.3 MG/ML
SOLUTION/ DROPS OPHTHALMIC
Qty: 200 EACH | Refills: 3 | Status: SHIPPED | OUTPATIENT
Start: 2020-12-09

## 2020-12-10 ENCOUNTER — OFFICE VISIT (OUTPATIENT)
Dept: PODIATRY | Facility: CLINIC | Age: 65
End: 2020-12-10

## 2020-12-10 VITALS
WEIGHT: 232 LBS | BODY MASS INDEX: 41.11 KG/M2 | HEIGHT: 63 IN | SYSTOLIC BLOOD PRESSURE: 147 MMHG | DIASTOLIC BLOOD PRESSURE: 83 MMHG | HEART RATE: 99 BPM

## 2020-12-10 DIAGNOSIS — M20.11 HALLUX VALGUS, RIGHT: Primary | ICD-10-CM

## 2020-12-10 PROCEDURE — 99024 POSTOP FOLLOW-UP VISIT: CPT | Performed by: PODIATRIST

## 2020-12-10 NOTE — PROGRESS NOTES
12/10/2020  Foot and Ankle Surgery - Established Patient/Follow-up  Provider: Dr. Bruce Giles, TORREY  Location: [facility]    Subjective:  Radha Davis is a 65 y.o. female.     Chief Complaint   Patient presents with   • Right Foot - Follow-up       HPI: Patient returns for follow-up on her right foot.  She states that she has returned to her regular shoe and doing quite well.  She denies any significant pain or limitation.  She continues to have mild swelling and discomfort at times but feels that this will continue to improve.    Allergies   Allergen Reactions   • Penicillin G Hives   • Sulfa Antibiotics Hives       Current Outpatient Medications on File Prior to Visit   Medication Sig Dispense Refill   • Accu-Chek FastClix Lancets misc USE TO TEST TWICE A  each 3   • ACCU-CHEK SMARTVIEW test strip USE TO TEST TWICE A DAY 50 each 15   • clopidogrel (PLAVIX) 75 MG tablet Take 75 mg by mouth Daily. LD 9/15  3   • DULERA 200-5 MCG/ACT inhaler Inhale 2 puffs As Needed.  5   • DULoxetine (CYMBALTA) 60 MG capsule Take 60 mg by mouth Daily.  1   • folic acid (FOLVITE) 1 MG tablet Take 1,000 mcg by mouth Daily.  3   • furosemide (LASIX) 40 MG tablet Take 40 mg by mouth 2 (Two) Times a Day.  5   • Insulin Lispro, 1 Unit Dial, (HUMALOG) 100 UNIT/ML solution pen-injector INJECT 15 UNITS UNDER THE SKIN INTO THE APPROPRIATE AREA AS DIRECTED 3 (THREE) TIMES A DAY BEFORE MEALS. 30 mL 12   • Insulin Pen Needle (B-D UF III MINI PEN NEEDLES) 31G X 5 MM misc USE with insulin pen 5X DAILY 200 each 3   • LANTUS SOLOSTAR 100 UNIT/ML injection pen INJECT 60 UNITS SUB-Q IN THE MORNING AND INJECT 66 UNITS AT BEDTIME (Patient taking differently: Inject  under the skin into the appropriate area as directed. INJECT 60 UNITS SUB-Q IN THE MORNING AND INJECT 66 UNITS AT BEDTIME  Hold DOS) 45 mL 3   • LYRICA 150 MG capsule Take 150 mg by mouth Every Night.  2   • metFORMIN ER (GLUCOPHAGE-XR) 500 MG 24 hr tablet TAKE 1 TABLET  "BY MOUTH BEFORE BREAKFAST & SUPPER 180 tablet 3   • nabumetone (RELAFEN) 500 MG tablet Take 500 mg by mouth Every Night.     • omeprazole (priLOSEC) 20 MG capsule Take 20 mg by mouth 2 (two) times a day.     • Polyethylene Glycol 3350 (MIRALAX PO) Take  by mouth Daily.     • pravastatin (PRAVACHOL) 10 MG tablet Take 10 mg by mouth Every Night.     • Probiotic Product (PROBIOTIC-10 PO) Take  by mouth Daily.     • spironolactone (ALDACTONE) 25 MG tablet Take 25 mg by mouth 2 (Two) Times a Day.     • Tresiba FlexTouch 100 UNIT/ML solution pen-injector injection INJECT 60 UNITS TWICE DAILY DX E11.65     • vitamin D (ERGOCALCIFEROL) 1.25 MG (70067 UT) capsule capsule TAKE 1 CAPSULE BY MOUTH WEEKLY 12 capsule 3     No current facility-administered medications on file prior to visit.        Objective   /83   Pulse 99   Ht 160 cm (63\")   Wt 105 kg (232 lb)   LMP  (LMP Unknown)   BMI 41.10 kg/m²     Podiatry Exam       General Appearance:  obese.    Mental Status Exam        Judgement and Insight:  Intact       Orientation:  Oriented to time, place, and person  Cardiovascular (Bilateral)       Dorsalis Pedis Pulse (BL):  2/4       Posterior Tibialis Pulse (BL):  2/4       Capillary Filling Time (BL):  1-3 Seconds       Edema (BL):  No Edema  Dermatological Exam       Temperature:  warm to warm  Skin: Incision sites are well-healed.  No signs of infection.  Moderate induration to the first interspace incision site with mild tenderness  Neurological Exam (Bilateral)       Paresthesia (Bl):  negative       Patella DTRs (Bl):  symmetric       Achilles DTRs (Bl):  symmetric       Tinel over Tarsal Tunnel (Bl):  negative  Monofilament Test (Bl):   not intact       Diabetic Risk (Bl):  Loss of protective sensation with no weakness deformity callus or pre-ulcer  Hypertrophic Nail Description (Left)       Thickened:  1, 2, 3, 4, 5       >3mm:  1, 2, 3, 4, 5       Onychomycosis:  1  Hypertrophic Nail Description " (Right)       Thickened:  1, 2, 3, 4, 5       >3mm:  1, 2, 3, 4, 5     MusculoSkeletal Exam (Bilateral)       Gait and Stance (Bl):   abducted in a pronated, bilateral.       ROM (Bl):    Ankle and pedal joint range of motion is somewhat limited but nontender crepitus free       Muscle Strength (Bl):  symmetrical 5/5       Subluxed Digits (Bl): Decreased bunion deformity with rectus alignment of the great toe       Inflammed Joints (Bl):  no inflammation of joints       Hammertoe Deformity (Bl):  none       Claw Toe Deformity (Bl): none       Medial Turbicle Calc (Bl):  no pain       Gastroc soleus equinus (Bl):  Inability to dorsiflex past neutral position both straight legged and bent knee       Post tibial tendon (Bl):  no soreness noted       Peroneal Tendon (Bl):  no soreness noted       Capsulitis of the MPJs (Bl):  no soreness noted  Additional Musculoskelatal Findings  No significant pain with palpation.    Assessment/Plan   Diagnoses and all orders for this visit:    1. Hallux valgus, right (Primary)  -     XR Foot 3+ View Right      Patient returns with physical exam unchanged.  She has noticed improvement and has returned to her regular shoe.  Imaging was reviewed showing no gross deformity and stable internal fixation.  I have asked that she continue range of motion and manual therapy exercises.  She is to wear supportive shoes on a daily basis.  I do feel that symptoms will continue to improve.  She is to see me on an as-needed basis at this time.  She states that she may want to proceed with the left foot in the near future.    Orders Placed This Encounter   Procedures   • XR Foot 3+ View Right     Weight Bearing     Order Specific Question:   Reason for Exam:     Answer:   Right foot F/U has had surgery to the foot in the past RM 10 WB          Note is dictated utilizing voice recognition software. Unfortunately this leads to occasional typographical errors. I apologize in advance if the situation  occurs. If questions occur please do not hesitate to call our office.

## 2021-04-02 RX ORDER — LANCETS
EACH MISCELLANEOUS
Qty: 102 EACH | Refills: 3 | OUTPATIENT
Start: 2021-04-02

## 2022-02-03 RX ORDER — PEN NEEDLE, DIABETIC 31 GX5/16"
NEEDLE, DISPOSABLE MISCELLANEOUS
Qty: 400 EACH | OUTPATIENT
Start: 2022-02-03

## (undated) DEVICE — GOWN,REINFORCE,POLY,SIRUS,BREATH SLV,XLG: Brand: MEDLINE

## (undated) DEVICE — DRILL BIT: Brand: MICA

## (undated) DEVICE — HEX DRIVER: Brand: MICA

## (undated) DEVICE — BNDG ELAS MATRX V/CLS 4IN 5YD LF

## (undated) DEVICE — Device

## (undated) DEVICE — TBG CONN IRRI SURG

## (undated) DEVICE — GAUZE,SPONGE,FLUFF,6"X6.75",STRL,5/TRAY: Brand: MEDLINE

## (undated) DEVICE — K-WIRE BLUNT/TROCAR
Type: IMPLANTABLE DEVICE | Site: FOOT | Status: NON-FUNCTIONAL
Removed: 2020-09-18

## (undated) DEVICE — APPL CHLORAPREP HI/LITE TINTED 10.5ML ORNG

## (undated) DEVICE — OCCLUSIVE GAUZE STRIP OVERWRAP,3% BISMUTH TRIBROMOPHENATE IN PETROLATUM BLEND: Brand: XEROFORM

## (undated) DEVICE — SPNG GZ STRL 2S 4X4 12PLY

## (undated) DEVICE — BURR: Brand: MICA

## (undated) DEVICE — CUSTOM PACK: Brand: UNBRANDED

## (undated) DEVICE — BNDG ESMARK 4IN 12FT LF STRL BLU

## (undated) DEVICE — MIS STERILE INSTRUMENT PACK W/ BLADE: Brand: PROSTEP™

## (undated) DEVICE — PK EXTREM 50

## (undated) DEVICE — UNDYED BRAIDED (POLYGLACTIN 910), SYNTHETIC ABSORBABLE SUTURE: Brand: COATED VICRYL

## (undated) DEVICE — KT SURG TURNOVER 050

## (undated) DEVICE — NDL HYPO PRECISIONGLIDE/REG 18G 1IN PNK

## (undated) DEVICE — WEDGE BURR: Brand: MICA

## (undated) DEVICE — SOL IRRIG NACL 9PCT 1000ML BTL

## (undated) DEVICE — INST GAUGE DEPTH PROSTEP MICA STRL 1P/U

## (undated) DEVICE — UNDERGLV SURG BIOGEL INDICAT PI SZ8 BLU

## (undated) DEVICE — INTENDED FOR TISSUE SEPARATION, AND OTHER PROCEDURES THAT REQUIRE A SHARP SURGICAL BLADE TO PUNCTURE OR CUT.: Brand: BARD-PARKER ® CARBON RIB-BACK BLADES

## (undated) DEVICE — GLV SURG BIOGEL M LTX PF 7 1/2

## (undated) DEVICE — SOL IRRIG H2O 1000ML STRL

## (undated) DEVICE — FIRST MET TRANSLATOR: Brand: PROSTEP MICA

## (undated) DEVICE — CUFF TOURNI 1BLADDER 1PRT 18IN STRL